# Patient Record
Sex: MALE | Race: WHITE | Employment: FULL TIME | URBAN - METROPOLITAN AREA
[De-identification: names, ages, dates, MRNs, and addresses within clinical notes are randomized per-mention and may not be internally consistent; named-entity substitution may affect disease eponyms.]

---

## 2017-05-10 ENCOUNTER — TRANSFERRED RECORDS (OUTPATIENT)
Dept: HEALTH INFORMATION MANAGEMENT | Facility: CLINIC | Age: 57
End: 2017-05-10

## 2017-05-26 ENCOUNTER — TRANSFERRED RECORDS (OUTPATIENT)
Dept: HEALTH INFORMATION MANAGEMENT | Facility: CLINIC | Age: 57
End: 2017-05-26

## 2017-06-09 ENCOUNTER — MEDICAL CORRESPONDENCE (OUTPATIENT)
Dept: HEALTH INFORMATION MANAGEMENT | Facility: CLINIC | Age: 57
End: 2017-06-09

## 2017-06-16 DIAGNOSIS — H53.10 SUBJECTIVE VISUAL DISTURBANCE: Primary | ICD-10-CM

## 2017-06-19 ENCOUNTER — TELEPHONE (OUTPATIENT)
Dept: OPHTHALMOLOGY | Facility: CLINIC | Age: 57
End: 2017-06-19

## 2017-06-19 NOTE — TELEPHONE ENCOUNTER
----- Message from Ritu Rodríguez sent at 6/16/2017  4:50 PM CDT -----      ----- Message -----     From: Tee Yost MD     Sent: 6/16/2017   6:52 AM       To: ASTRID Gutierrez    Can you please make sure patient brings films or they get pushed? Thanks.

## 2017-06-22 ENCOUNTER — OFFICE VISIT (OUTPATIENT)
Dept: OPHTHALMOLOGY | Facility: CLINIC | Age: 57
End: 2017-06-22
Attending: OPHTHALMOLOGY
Payer: COMMERCIAL

## 2017-06-22 DIAGNOSIS — H50.15 ALTERNATING EXOTROPIA: ICD-10-CM

## 2017-06-22 DIAGNOSIS — H53.10 SUBJECTIVE VISUAL DISTURBANCE: ICD-10-CM

## 2017-06-22 DIAGNOSIS — G70.00 MYASTHENIA (H): Primary | ICD-10-CM

## 2017-06-22 PROCEDURE — 92060 SENSORIMOTOR EXAMINATION: CPT | Mod: ZF | Performed by: OPHTHALMOLOGY

## 2017-06-22 PROCEDURE — 99215 OFFICE O/P EST HI 40 MIN: CPT | Mod: ZF

## 2017-06-22 PROCEDURE — 36415 COLL VENOUS BLD VENIPUNCTURE: CPT | Performed by: OPHTHALMOLOGY

## 2017-06-22 PROCEDURE — 83519 RIA NONANTIBODY: CPT | Performed by: OPHTHALMOLOGY

## 2017-06-22 RX ORDER — ATORVASTATIN CALCIUM 10 MG/1
10 TABLET, FILM COATED ORAL DAILY
COMMUNITY
Start: 2017-05-19

## 2017-06-22 RX ORDER — ASPIRIN 81 MG/1
81 TABLET ORAL EVERY EVENING
COMMUNITY
Start: 2012-02-24

## 2017-06-22 RX ORDER — METRONIDAZOLE 10 MG/G
GEL TOPICAL
COMMUNITY
Start: 2017-04-07 | End: 2018-09-26

## 2017-06-22 RX ORDER — PYRIDOSTIGMINE BROMIDE 60 MG/1
60 TABLET ORAL 3 TIMES DAILY
Qty: 90 TABLET | Refills: 3 | Status: SHIPPED | OUTPATIENT
Start: 2017-06-22 | End: 2017-06-28

## 2017-06-22 RX ORDER — LOSARTAN POTASSIUM AND HYDROCHLOROTHIAZIDE 12.5; 5 MG/1; MG/1
1 TABLET ORAL 2 TIMES DAILY
COMMUNITY
Start: 2017-05-19

## 2017-06-22 ASSESSMENT — REFRACTION_WEARINGRX
SPECS_TYPE: PAL
OD_SPHERE: -3.25
OD_ADD: +2.50
OS_ADD: +2.50
OS_CYLINDER: +1.25
OS_SPHERE: -3.50
OS_AXIS: 175
OD_AXIS: 180
OD_CYLINDER: +1.50

## 2017-06-22 ASSESSMENT — CONF VISUAL FIELD
OD_SUPERIOR_TEMPORAL_RESTRICTION: 3
OS_SUPERIOR_TEMPORAL_RESTRICTION: 3
COMMENTS: UPPER LIDS

## 2017-06-22 ASSESSMENT — SLIT LAMP EXAM - LIDS: COMMENTS: MILD PTOSIS

## 2017-06-22 ASSESSMENT — TONOMETRY
IOP_METHOD: TONOPEN
OD_IOP_MMHG: 18
OS_IOP_MMHG: 19

## 2017-06-22 ASSESSMENT — VISUAL ACUITY
OD_CC: 20/30
CORRECTION_TYPE: GLASSES
OS_CC+: -1
METHOD: SNELLEN - LINEAR
OS_CC: 20/25
OD_PH_CC: 20/25+2

## 2017-06-22 ASSESSMENT — MARGIN REFLEX DISTANCE
OS_MRD1: -3
OD_MRD1: 1

## 2017-06-22 ASSESSMENT — CUP TO DISC RATIO
OD_RATIO: 0.3
OS_RATIO: 0.3

## 2017-06-22 ASSESSMENT — EXTERNAL EXAM - RIGHT EYE: OD_EXAM: NORMAL

## 2017-06-22 ASSESSMENT — EXTERNAL EXAM - LEFT EYE: OS_EXAM: NORMAL

## 2017-06-22 NOTE — MR AVS SNAPSHOT
After Visit Summary   6/22/2017    Fidel Dailey    MRN: [id number]           Patient Information     Date Of Birth          1960        Visit Information        Provider Department      6/22/2017 7:30 AM Tee Yost MD [facility]        Today's Diagnoses     Myasthenia (H)    -  1    Subjective visual disturbance           Follow-ups after your visit        Your next 10 appointments already scheduled     Jun 22, 2017  3:40 PM CDT   (Arrive by 3:25 PM)   CT CHEST W CONTRAST with UCCT2   [facility] [place] [facility] ([facility])    [address] [address] [phone]           Please bring any scans or X-rays taken at other hospitals, if similar tests were done. Also bring a list of your medicines, including vitamins, minerals and over-the-counter drugs. It is safest to leave personal items at home.  Be sure to tell your doctor:   If you have any allergies.   If there s any chance you are pregnant.   If you are breastfeeding.   If you have any special needs.  You will have contrast for this exam. To prepare:   Do not eat or drink for 2 hours before your exam. If you need to take medicine, you may take it with small sips of water. (We may ask you to take liquid medicine as well.)   The day before your exam, drink extra fluids at least six 8-ounce glasses (unless your doctor tells you to restrict your fluids).  Patients over 70 or patients with diabetes or kidney problems:   If you haven t had a blood test (creatinine test) within the last 30 days, go to your clinic or Diagnostic Imaging Department for this test.  If you have diabetes:   If your kidney function is normal, continue taking your metformin (Avandamet, Glucophage, Glucovance, Metaglip) on the day of your exam.   If your kidney function is abnormal, wait 48 hours before restarting this medicine.  Please wear loose clothing, such as a sweat suit or jogging clothes.  Avoid snaps, zippers and other metal. We may ask you to undress and put on a hospital gown.  If you have any questions, please call the Imaging Department where you will have your exam.            2017  8:00 AM CDT   RETURN NEURO with Tee Yost MD   Eye Clinic ([facility])    [address]  [address]  [address]  [facility] [address]   [phone]              Future tests that were ordered for you today     Open Future Orders        Priority Expected Expires Ordered    CT Chest w contrast* Routine  2018            Who to contact     Please call your clinic at [phone] to:    Ask questions about your health    Make or cancel appointments    Discuss your medicines    Learn about your test results    Speak to your doctor   If you have compliments or concerns about an experience at your clinic, or if you wish to file a complaint, please contact [facility] Patient Relations at [phone] or email us at [email or web address]         Additional Information About Your Visit        Shopliment Information     Shopliment is an electronic gateway that provides easy, online access to your medical records. With Shopliment, you can request a clinic appointment, read your test results, renew a prescription or communicate with your care team.     To sign up for Shopliment visit the website at [email or web address]   You will be asked to enter the access code listed below, as well as some personal information. Please follow the directions to create your username and password.     Your access code is: [email or web address]  Expires: 9/10/2017  6:31 AM     Your access code will  in 90 days. If you need help or a new code, please contact your [facility] or call [phone] for assistance.        Care EveryWhere ID     This is your Care EveryWhere ID. This could be used by other organizations to access  your [place] medical records  [id number]         Blood Pressure from Last 3 Encounters:   No data found for BP    Weight from Last 3 Encounters:   No data found for Wt              We Performed the Following     Acetylcholine receptor binding          Today's Medication Changes          These changes are accurate as of: 6/22/17  9:24 AM.  If you have any questions, ask your nurse or doctor.               Start taking these medicines.        Dose/Directions    pyridostigmine 60 MG tablet   Commonly known as:  MESTINON   Used for:  Subjective visual disturbance, Myasthenia (H)   Started by:  Tee Yost MD        Dose:  60 mg   Take 1 tablet (60 mg) by mouth 3 times daily   Quantity:  90 tablet   Refills:  3            Where to get your medicines      These medications were sent to [facility] - [address] [address]     Phone:  [phone]     pyridostigmine 60 MG tablet                Primary Care Provider Office Phone # Fax #    Tyree Alcantara [phone]       [facility] [place] [address]   [place] [address]        Equal Access to Services     [facility]: Hadii aad ku hadasho Soomaali, waaxda luqadaha, qaybta kaalmada adeegyada, waxay natalia haychristopher gamez . So [facility] [phone].    ATENCIÓN: Si habla español, tiene a mehta disposición servicios gratuitos de asistencia lingüística. [facility] [phone].    We comply with applicable federal civil rights laws and [place] laws. We do not discriminate on the basis of race, color, national origin, age, disability sex, sexual orientation or gender identity.            Thank you!     Thank you for choosing EYE CLINIC  for your care. Our goal is always to provide you with excellent care. Hearing back from our patients is one way we can continue to improve our services. Please take a few minutes to complete the written survey that you may  receive in the mail after your visit with us. Thank you!             Your Updated Medication List - Protect others around you: Learn how to safely use, store and throw away your medicines at www.disposemymeds.org.          This list is accurate as of: 6/22/17  9:24 AM.  Always use your most recent med list.                   Brand Name Dispense Instructions for use Diagnosis    aspirin EC 81 MG EC tablet      Take 81 mg by mouth        atorvastatin 10 MG tablet    LIPITOR     Take 10 mg by mouth        BYETTA 10 MCG PEN 10 MCG/0.04ML injection   Generic drug:  exenatide           losartan-hydrochlorothiazide 50-12.5 MG per tablet    HYZAAR     Take 1 tablet by mouth        metFORMIN 1000 MG tablet    GLUCOPHAGE          metroNIDAZOLE 1 % gel    METROGEL          pyridostigmine 60 MG tablet    MESTINON    90 tablet    Take 1 tablet (60 mg) by mouth 3 times daily    Subjective visual disturbance, Myasthenia (H)

## 2017-06-22 NOTE — PROGRESS NOTES
Assessment & Plan     Fidel Dailey is a 56 year old male with the following diagnoses:   1. Myasthenia (H)    2. Subjective visual disturbance    3. Alternating exotropia       Fidel Dailey is a 56 year old male presenting with 9 week history of acute onset left ptosis and diplopia. Positive Cogan lid twitch and positive ice pack test and sleep test  in clinic today, concerning for possible diagnosis of myasthenia gravis. This would fit with his previous similar symptoms last October with the right eyelid. His MRI was not strongly convincing for third nerve schwannoma. Will trial Mestinon and check myasthenia labs, as well as chest CT to evaluate for mediastinal tumor.  Follow up 3 weeks sooner as needed for worsening symptoms.               Attending Physician Attestation:  Complete documentation of historical and exam elements from today's encounter can be found in the full encounter summary report (not reduplicated in this progress note).  I personally obtained the chief complaint(s) and history of present illness.  I confirmed and edited as necessary the review of systems, past medical/surgical history, family history, social history, and examination findings as documented by others; and I examined the patient myself.  I personally reviewed the relevant tests, images, and reports as documented above.  I formulated and edited as necessary the assessment and plan and discussed the findings and management plan with the patient and family. - Tee Yost MD    Baseline          After the ice test      After the sleep test

## 2017-06-22 NOTE — NURSING NOTE
"Chief Complaints and History of Present Illnesses   Patient presents with     Consult For     referred by Dr. Alcantara (PCP) for double vision, 3rd nerve palsy, nerve sheath tumor     HPI    Affected eye(s):  Both   Symptoms:     No decreased vision   Double vision (Comment: started off and on at the end of April; goes away if pt closes an eye)   Floaters (Comment: longstanding/stable)   No flashes      Duration:  9 weeks      Do you have eye pain now?:  No      Comments:  Pt referred by PCP for diplopia, 3rd nerve palsy, nerve sheath tumor  MRI done in May - report scanned in Epic  Pt states dizziness has been more prevalent over the past couple weeks - believes related to double vision \"like motion sickness\"    Pt is diabetic but doesn't check BS daily - states his A1c is good    Sheridan Diaz COA 7:17 AM June 22, 2017              "

## 2017-06-23 ENCOUNTER — TELEPHONE (OUTPATIENT)
Dept: OPHTHALMOLOGY | Facility: CLINIC | Age: 57
End: 2017-06-23

## 2017-06-23 NOTE — TELEPHONE ENCOUNTER
Message  Received: Yesterday       Tee Yost MD Crannick, David, COA                     Your CT chest did not show any mass under your breast bone.  You had a few nodules.  Like we discussed, these are common and likely do not represent anything concerning. The radiologist did not even comment on a repeat scan for them.        Thank you for allowing me to share in your care.     Tee Yost MD

## 2017-06-27 DIAGNOSIS — H53.10 SUBJECTIVE VISUAL DISTURBANCE: Primary | ICD-10-CM

## 2017-06-28 DIAGNOSIS — H53.10 SUBJECTIVE VISUAL DISTURBANCE: ICD-10-CM

## 2017-06-28 DIAGNOSIS — G70.00 MYASTHENIA (H): ICD-10-CM

## 2017-06-28 RX ORDER — PYRIDOSTIGMINE BROMIDE 60 MG/1
60 TABLET ORAL 3 TIMES DAILY
Qty: 90 TABLET | Refills: 3 | Status: SHIPPED | OUTPATIENT
Start: 2017-06-28 | End: 2018-09-26

## 2017-06-29 LAB — ACHR BIND AB SER-SCNC: 7.8 NMOL/L

## 2017-06-30 ENCOUNTER — MYC MEDICAL ADVICE (OUTPATIENT)
Dept: OPHTHALMOLOGY | Facility: CLINIC | Age: 57
End: 2017-06-30

## 2017-06-30 NOTE — TELEPHONE ENCOUNTER
Pt started mestinon yesterday 60 mg 3/day  Pt states both legs noticably twitching and and tingling last night and somewhat today  Not severe and tolerable  Pt would like to update dr. Yost    Pt states will continue to use.    Note to dr. Chavez for review and callback to pt if would like to change medication regimen  Dr. Chavez in this afternoon    Pt seemed pleased with plan  Enrique Pascal RN 11:34 AM 06/30/17

## 2017-07-11 ENCOUNTER — OFFICE VISIT (OUTPATIENT)
Dept: OPHTHALMOLOGY | Facility: CLINIC | Age: 57
End: 2017-07-11
Attending: OPHTHALMOLOGY
Payer: COMMERCIAL

## 2017-07-11 DIAGNOSIS — G70.00 MYASTHENIA (H): Primary | ICD-10-CM

## 2017-07-11 DIAGNOSIS — H50.15 ALTERNATING EXOTROPIA: ICD-10-CM

## 2017-07-11 DIAGNOSIS — H53.10 SUBJECTIVE VISUAL DISTURBANCE: ICD-10-CM

## 2017-07-11 PROCEDURE — 92060 SENSORIMOTOR EXAMINATION: CPT | Mod: ZF | Performed by: OPHTHALMOLOGY

## 2017-07-11 PROCEDURE — 99212 OFFICE O/P EST SF 10 MIN: CPT | Mod: ZF

## 2017-07-11 RX ORDER — PREDNISONE 10 MG/1
50 TABLET ORAL DAILY
Qty: 150 TABLET | Refills: 3 | Status: SHIPPED | OUTPATIENT
Start: 2017-07-11 | End: 2017-08-01

## 2017-07-11 ASSESSMENT — REFRACTION_WEARINGRX
OS_AXIS: 175
OD_CYLINDER: +1.50
OD_SPHERE: -3.25
OD_ADD: +2.50
SPECS_TYPE: PAL
OD_AXIS: 180
OS_SPHERE: -3.50
OS_CYLINDER: +1.25
OS_ADD: +2.50

## 2017-07-11 ASSESSMENT — VISUAL ACUITY
OS_CC: 20/25
CORRECTION_TYPE: GLASSES
OD_PH_CC: 20/25
OD_CC: 20/30
METHOD: SNELLEN - LINEAR

## 2017-07-11 ASSESSMENT — EXTERNAL EXAM - LEFT EYE: OS_EXAM: NORMAL

## 2017-07-11 ASSESSMENT — CONF VISUAL FIELD
OS_SUPERIOR_TEMPORAL_RESTRICTION: 3
OD_NORMAL: 1
OS_INFERIOR_NASAL_RESTRICTION: 3

## 2017-07-11 ASSESSMENT — TONOMETRY
OD_IOP_MMHG: 20
OS_IOP_MMHG: 18
IOP_METHOD: ICARE

## 2017-07-11 ASSESSMENT — EXTERNAL EXAM - RIGHT EYE: OD_EXAM: NORMAL

## 2017-07-11 ASSESSMENT — SLIT LAMP EXAM - LIDS
COMMENTS: NORMAL
COMMENTS: NORMAL

## 2017-07-11 NOTE — NURSING NOTE
Chief Complaints and History of Present Illnesses   Patient presents with     Follow Up For     Myasthenia      HPI    Affected eye(s):  Both   Symptoms:        Duration:  1 month   Frequency:  Constant       Do you have eye pain now?:  No      Comments:  Pt. States no change in VA BE.  Has been having headaches daily.  LE still wanting to stay closed at all times.   Jessica Griggs COT 8:16 AM July 11, 2017

## 2017-07-11 NOTE — MR AVS SNAPSHOT
After Visit Summary   7/11/2017    Fidel Dailey    MRN: [id number]           Patient Information     Date Of Birth          1960        Visit Information        Provider Department      7/11/2017 8:00 AM Tee Yost MD [facility]        Today's Diagnoses     Myasthenia (H)    -  1    Subjective visual disturbance           Follow-ups after your visit        Follow-up notes from your care team     Return in about 3 weeks (around 8/1/2017).      Your next 10 appointments already scheduled     Aug 01, 2017  9:00 AM CDT   RETURN NEURO with Tee Yost MD   Eye Clinic ([facility])    [address]  [address]  9th [address] Clin [address]a  [facility] [address] [phone]              Who to contact     Please call your clinic at [phone] to:    Ask questions about your health    Make or cancel appointments    Discuss your medicines    Learn about your test results    Speak to your doctor   If you have compliments or concerns about an experience at your clinic, or if you wish to file a complaint, please contact [facility] Patient Relations at [phone] or email us at [email or web address]         Additional Information About Your Visit        MyChart Information     Vivoxidt gives you secure access to your electronic health record. If you see a primary care provider, you can also send messages to your care team and make appointments. If you have questions, please call your primary care clinic.  If you do not have a primary care provider, please call [phone] and they will assist you.      IceBreaker is an electronic gateway that provides easy, online access to your medical records. With IceBreaker, you can request a clinic appointment, read your test results, renew a prescription or communicate with your care team.     To access your existing account, please contact your [facility] or  call [phone] for assistance.        Care EveryWhere ID     This is your Care EveryWhere ID. This could be used by other organizations to access your [place] medical records  [id number]         Blood Pressure from Last 3 Encounters:   No data found for BP    Weight from Last 3 Encounters:   No data found for Wt              We Performed the Following     Color Vision - Screening OU (both eyes)     IOP Measurement          Today's Medication Changes          These changes are accurate as of: 7/11/17  8:49 AM.  If you have any questions, ask your nurse or doctor.               Start taking these medicines.        Dose/Directions    predniSONE 10 MG tablet   Commonly known as:  DELTASONE   Used for:  Subjective visual disturbance, Myasthenia (H)   Started by:  Tee Yost MD        Dose:  50 mg   Take 5 tablets (50 mg) by mouth daily   Quantity:  150 tablet   Refills:  3            Where to get your medicines      These medications were sent to [facility]pharm[facility] #[id number] - [place], [place] - [address] [facility] [address]     Phone:  [phone]     predniSONE 10 MG tablet                Primary Care Provider Office Phone # Fax #    Tyree Alcantara [phone]       [facility] [place] [address] [facility] [address]        Equal Access to Services     THOAMS ZIEGLER AH: Hadii suzan ku hadasho Soomaali, waaxda luqadaha, qaybta kaalmada adeegyada, jone dee. So [facility] [phone].    ATENCIÓN: Si habla español, tiene a mehta disposición servicios gratuitos de asistencia lingüística. Llame al [phone].    We comply with applicable federal civil rights laws and [place] laws. We do not discriminate on the basis of race, color, national origin, age, disability sex, sexual orientation or gender identity.            Thank you!     Thank you for choosing EYE CLINIC  for your care. Our goal is always to provide you with excellent care. Hearing  back from our patients is one way we can continue to improve our services. Please take a few minutes to complete the written survey that you may receive in the mail after your visit with us. Thank you!             Your Updated Medication List - Protect others around you: Learn how to safely use, store and throw away your medicines at www.disposemymeds.org.          This list is accurate as of: 7/11/17  8:49 AM.  Always use your most recent med list.                   Brand Name Dispense Instructions for use Diagnosis    aspirin EC 81 MG EC tablet      Take 81 mg by mouth        atorvastatin 10 MG tablet    LIPITOR     Take 10 mg by mouth        BYETTA 10 MCG PEN 10 MCG/0.04ML injection   Generic drug:  exenatide           losartan-hydrochlorothiazide 50-12.5 MG per tablet    HYZAAR     Take 1 tablet by mouth        metFORMIN 1000 MG tablet    GLUCOPHAGE          metroNIDAZOLE 1 % gel    METROGEL          predniSONE 10 MG tablet    DELTASONE    150 tablet    Take 5 tablets (50 mg) by mouth daily    Subjective visual disturbance, Myasthenia (H)       pyridostigmine 60 MG tablet    MESTINON    90 tablet    Take 1 tablet (60 mg) by mouth 3 times daily    Subjective visual disturbance, Myasthenia (H)

## 2017-07-11 NOTE — PROGRESS NOTES
Assessment & Plan     Fidel Dailey is a 56 year old male with the following diagnoses:   1. Myasthenia (H)    2. Subjective visual disturbance      Try stopping mestinon  Try a trial of prednisone 50 mg/d   Follow up with primary care doctor within 4 days of starting 50 mg  Follow up with me in 2-3 weeks sooner as needed for worsening symptoms   Consider repeat MRI for potential schwannoma if no benefit to prednisone             Attending Physician Attestation:  Complete documentation of historical and exam elements from today's encounter can be found in the full encounter summary report (not reduplicated in this progress note).  I personally obtained the chief complaint(s) and history of present illness.  I confirmed and edited as necessary the review of systems, past medical/surgical history, family history, social history, and examination findings as documented by others; and I examined the patient myself.  I personally reviewed the relevant tests, images, and reports as documented above.  I formulated and edited as necessary the assessment and plan and discussed the findings and management plan with the patient and family. - Tee Yost MD

## 2017-08-01 ENCOUNTER — OFFICE VISIT (OUTPATIENT)
Dept: OPHTHALMOLOGY | Facility: CLINIC | Age: 57
End: 2017-08-01
Attending: OPHTHALMOLOGY
Payer: COMMERCIAL

## 2017-08-01 DIAGNOSIS — H53.10 SUBJECTIVE VISUAL DISTURBANCE: ICD-10-CM

## 2017-08-01 DIAGNOSIS — G70.00 MYASTHENIA (H): Primary | ICD-10-CM

## 2017-08-01 PROCEDURE — 99212 OFFICE O/P EST SF 10 MIN: CPT | Mod: ZF

## 2017-08-01 RX ORDER — PREDNISONE 10 MG/1
50 TABLET ORAL DAILY
Qty: 150 TABLET | Refills: 3 | Status: SHIPPED | OUTPATIENT
Start: 2017-08-01 | End: 2018-09-26

## 2017-08-01 ASSESSMENT — TONOMETRY
OD_IOP_MMHG: 23
OS_IOP_MMHG: 21
IOP_METHOD: ICARE

## 2017-08-01 ASSESSMENT — VISUAL ACUITY
OD_CC+: -2
OD_CC: 20/30
OS_PH_CC: 20/20-2
OS_CC: 20/25
METHOD: SNELLEN - LINEAR
OS_CC+: -2
CORRECTION_TYPE: GLASSES
OD_PH_CC: 20/20

## 2017-08-01 ASSESSMENT — CONF VISUAL FIELD
OS_NORMAL: 1
OD_NORMAL: 1

## 2017-08-01 ASSESSMENT — SLIT LAMP EXAM - LIDS
COMMENTS: NORMAL
COMMENTS: NORMAL

## 2017-08-01 ASSESSMENT — EXTERNAL EXAM - LEFT EYE: OS_EXAM: PTOSIS

## 2017-08-01 ASSESSMENT — EXTERNAL EXAM - RIGHT EYE: OD_EXAM: PTOSIS

## 2017-08-01 NOTE — PROGRESS NOTES
Assessment & Plan     Fidel Dailey is a 56 year old male with the following diagnoses:   1. 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  No systemic symptoms.  He is improved on mestinon.  He has been on prednisone 50 mg x 1 week without significant benefit but some.  Will keep on prednisone 40 mg/d x 2 weeks.  Will give him a 10 base in Fresnel prism LEFT eye today.  Follow up 6 weeks sooner as needed for worsening symptoms.              Attending Physician Attestation:  Complete documentation of historical and exam elements from today's encounter can be found in the full encounter summary report (not reduplicated in this progress note).  I personally obtained the chief complaint(s) and history of present illness.  I confirmed and edited as necessary the review of systems, past medical/surgical history, family history, social history, and examination findings as documented by others; and I examined the patient myself.  I personally reviewed the relevant tests, images, and reports as documented above.  I formulated and edited as necessary the assessment and plan and discussed the findings and management plan with the patient and family. - Tee Yost MD

## 2017-08-01 NOTE — NURSING NOTE
Chief Complaints and History of Present Illnesses   Patient presents with     Follow Up For     Myasthenia     HPI    Affected eye(s):  Both   Symptoms:        Duration:  2 weeks   Frequency:  Constant       Do you have eye pain now?:  No      Comments:  Pt. States that he is doing well.  VA is still blurred but has improved.   Drooping has also improved.   Jessica Griggs COT 9:22 AM August 1, 2017

## 2017-08-01 NOTE — MR AVS SNAPSHOT
After Visit Summary   8/1/2017    Fidel Dailey    MRN: [id number]           Patient Information     Date Of Birth          1960        Visit Information        Provider Department      8/1/2017 9:00 AM Tee Yost MD Eye Clinic        Today's Diagnoses     Myasthenia (H)    -  1    Subjective visual disturbance           Follow-ups after your visit        Follow-up notes from your care team     Return in about 6 weeks (around 9/12/2017) for Vision, tension color.      Your next 10 appointments already scheduled     Sep 14, 2017 10:00 AM CDT   RETURN NEURO with Tee Yost MD   Eye Clinic ([facility])    [address]  [address]  9th [address] Clin [address]  [facility] [address] [phone]              Who to contact     Please call your clinic at [phone] to:    Ask questions about your health    Make or cancel appointments    Discuss your medicines    Learn about your test results    Speak to your doctor   If you have compliments or concerns about an experience at your clinic, or if you wish to file a complaint, please contact [facility] Patient Relations at [phone] or email us at [email or web address]         Additional Information About Your Visit        MyChart Information     Jobvitet gives you secure access to your electronic health record. If you see a primary care provider, you can also send messages to your care team and make appointments. If you have questions, please call your primary care clinic.  If you do not have a primary care provider, please call [phone] and they will assist you.      NetHooks is an electronic gateway that provides easy, online access to your medical records. With NetHooks, you can request a clinic appointment, read your test results, renew a prescription or communicate with your care team.     To access your existing account, please contact your [facility]  [place] [facility] or call [phone] for assistance.        Care EveryWhere ID     This is your Care EveryWhere ID. This could be used by other organizations to access your [place] medical records  [id number]         Blood Pressure from Last 3 Encounters:   No data found for BP    Weight from Last 3 Encounters:   No data found for Wt              Today, you had the following     No orders found for display         Where to get your medicines      These medications were sent to [facility] - [place] [address] [facility]  [address] [facility] [address]     Phone:  [phone]     predniSONE 10 MG tablet          Primary Care Provider Office Phone # Fax #    Tyree Alcantara [phone]       [facility] [address] [facility] [address]        Equal Access to Services     THOMAS ZIEGLER : Hadii suzan ku hadasho Soomaali, waaxda luqadaha, qaybta kaalmada adeegyada, waxruddy fisher haychristopher gamez . So [facility] [phone].    ATENCIÓN: Si habla español, tiene a mehta disposición servicios gratuitos de asistencia lingüística. LlTri[facility] [phone].    We comply with applicable federal civil rights laws and [place] laws. We do not discriminate on the basis of race, color, national origin, age, disability sex, sexual orientation or gender identity.            Thank you!     Thank you for choosing EYE CLINIC  for your care. Our goal is always to provide you with excellent care. Hearing back from our patients is one way we can continue to improve our services. Please take a few minutes to complete the written survey that you may receive in the mail after your visit with us. Thank you!             Your Updated Medication List - Protect others around you: Learn how to safely use, store and throw away your medicines at www.disposemymeds.org.          This list is accurate as of: 8/1/17 10:06 AM.  Always use your most recent med list.                    Brand Name Dispense Instructions for use Diagnosis    aspirin EC 81 MG EC tablet      Take 81 mg by mouth        atorvastatin 10 MG tablet    LIPITOR     Take 10 mg by mouth        BYETTA 10 MCG PEN 10 MCG/0.04ML injection   Generic drug:  exenatide           losartan-hydrochlorothiazide 50-12.5 MG per tablet    HYZAAR     Take 1 tablet by mouth        metFORMIN 1000 MG tablet    GLUCOPHAGE          metroNIDAZOLE 1 % gel    METROGEL          predniSONE 10 MG tablet    DELTASONE    150 tablet    Take 5 tablets (50 mg) by mouth daily    Subjective visual disturbance, Myasthenia (H)       pyridostigmine 60 MG tablet    MESTINON    90 tablet    Take 1 tablet (60 mg) by mouth 3 times daily    Subjective visual disturbance, Myasthenia (H)

## 2017-09-11 DIAGNOSIS — Z01.00 EXAMINATION OF EYES AND VISION: Primary | ICD-10-CM

## 2017-09-14 ENCOUNTER — OFFICE VISIT (OUTPATIENT)
Dept: OPHTHALMOLOGY | Facility: CLINIC | Age: 57
End: 2017-09-14
Attending: OPHTHALMOLOGY
Payer: COMMERCIAL

## 2017-09-14 DIAGNOSIS — G70.00 MYASTHENIA (H): Primary | ICD-10-CM

## 2017-09-14 PROCEDURE — 99213 OFFICE O/P EST LOW 20 MIN: CPT | Mod: ZF

## 2017-09-14 ASSESSMENT — TONOMETRY
OS_IOP_MMHG: 26
IOP_METHOD: TONOPEN
IOP_METHOD: TONOPEN
OS_IOP_MMHG: 27
OD_IOP_MMHG: 27
OD_IOP_MMHG: 25

## 2017-09-14 ASSESSMENT — REFRACTION_WEARINGRX
OD_ADD: +2.50
OS_CYLINDER: +1.25
OS_AXIS: 175
OD_AXIS: 180
OD_CYLINDER: +1.50
SPECS_TYPE: PAL
OS_SPHERE: -3.50
OS_ADD: +2.50
OD_SPHERE: -3.25

## 2017-09-14 ASSESSMENT — SLIT LAMP EXAM - LIDS
COMMENTS: NORMAL
COMMENTS: NORMAL

## 2017-09-14 ASSESSMENT — VISUAL ACUITY
OD_CC: 20/30
OS_CC: 20/30
METHOD: SNELLEN - LINEAR
OD_PH_CC: 20/20
OS_CC+: +2
CORRECTION_TYPE: GLASSES
OD_CC+: -1
OS_PH_CC: 20/25+1

## 2017-09-14 ASSESSMENT — EXTERNAL EXAM - LEFT EYE: OS_EXAM: PTOSIS

## 2017-09-14 ASSESSMENT — EXTERNAL EXAM - RIGHT EYE: OD_EXAM: PTOSIS

## 2017-09-14 ASSESSMENT — CONF VISUAL FIELD
OD_NORMAL: 1
OS_NORMAL: 1

## 2017-09-14 NOTE — NURSING NOTE
Chief Complaints and History of Present Illnesses   Patient presents with     Follow Up For     myasthenia     HPI    Affected eye(s):  Both   Symptoms:        Duration:  6 weeks   Frequency:  Constant       Do you have eye pain now?:  No      Comments:  6 week f/u myasthenia  Pt took the [employer] prism off the left lens - felt he could see better without it  Pt has noted in the past couple days, the left eye is puffy  Pt notes the vision was better when on the higher doses of prednisone - vision going back to where it was with the taper    Sheridan RESENDIZ 10:03 AM September 14, 2017

## 2017-09-14 NOTE — PROGRESS NOTES
Assessment & Plan     Fidel Dailey is a 56 year old male with the following diagnoses:   1. Myasthenia (H)         He is here for follow up for myasthenia gravis. He is currently on Mestinon 60 mg TID and on Predisone 15 mg BID. He feels that he has been on the higher dose of Prednisone ( 50 mg per day). He has been fitted with a prism last time( 10 BI) over the left eye but he has took it because he does not think it is helping. Diplopia is only occasional but the droopiness of the left upper lid is most of the time. He is diabetic but his blood sugar has been around 100 mgdl/.    Double vision and exotropia seems better but he continues to have significant ptosis. Will try naphazoline eye drops for the ptosis. If no benefit, then will refer to neuromuscular for steroid sparing therapy and/or IVIG.           Attending Physician Attestation:  Complete documentation of historical and exam elements from today's encounter can be found in the full encounter summary report (not reduplicated in this progress note).  I personally obtained the chief complaint(s) and history of present illness.  I confirmed and edited as necessary the review of systems, past medical/surgical history, family history, social history, and examination findings as documented by others; and I examined the patient myself.  I personally reviewed the relevant tests, images, and reports as documented above.  I formulated and edited as necessary the assessment and plan and discussed the findings and management plan with the patient and family. - Tee Yost MD

## 2017-09-14 NOTE — MR AVS SNAPSHOT
After Visit Summary   9/14/2017    Fidel Dailey    MRN: [id number]           Patient Information     Date Of Birth          1960        Visit Information        Provider Department      9/14/2017 10:00 AM T[facility]ost [facility]        Today's Diagnoses     Myasthenia (H)    -  1       Follow-ups after your visit        Additional Services     NEUROLOGY ADULT REFERRAL       Your provider has referred you for the following: neuromuscular referral for seropositive myasthenia gravis       Please be aware that coverage of these services is subject to the terms and limitations of your health insurance plan.  Call member services at your health plan with any benefit or coverage questions.      Please bring the following with you to your appointment:    (1) Any X-Rays, CTs or MRIs which have been performed.  Contact the facility where they were done to arrange for  prior to your scheduled appointment.    (2) List of current medications  (3) This referral request   (4) Any documents/labs given to you for this referral                  Who to contact     Please call your clinic at [phone] to:    Ask questions about your health    Make or cancel appointments    Discuss your medicines    Learn about your test results    Speak to your doctor   If you have compliments or concerns about an experience at your clinic, or if you wish to file a complaint, please contact [facility] Patient Relations at [phone] or email us at [email or web address]         Additional Information About Your Visit        MyChart Information     [employer] gives you secure access to your electronic health record. If you see a primary care provider, you can also send messages to your care team and make appointments. If you have questions, please call your primary care clinic.  If you do not have a primary care provider, please call [phone] and they will assist  you.      [employer] is an electronic gateway that provides easy, online access to your medical records. With [employer], you can request a clinic appointment, read your test results, renew a prescription or communicate with your care team.     To access your existing account, please contact your [facility] or call [phone] for assistance.        Care EveryWhere ID     This is your Care EveryWhere ID. This could be used by other organizations to access your [place] medical records  [id number]         Blood Pressure from Last 3 Encounters:   No data found for BP    Weight from Last 3 Encounters:   No data found for Wt              We Performed the Following     NEUROLOGY ADULT REFERRAL        Primary Care Provider Office Phone # Fax #    Tyree Alcantara [phone]       [facility] [place] [address]   [place] [address]        Equal Access to Services     [facility] : Hadii aad ku hadasho Soomaali, waaxda luqadaha, qaybta kaalmada adeegyada, jone adairin haychristopher gamez . So [facility] [phone].    ATENCIÓN: Si habla español, tiene a mehta disposición servicios gratuitos de asistencia lingüística. Jeanne al [phone].    We comply with applicable federal civil rights laws and [place] laws. We do not discriminate on the basis of race, color, national origin, age, disability sex, sexual orientation or gender identity.            Thank you!     Thank you for choosing [facility]  for your care. Our goal is always to provide you with excellent care. Hearing back from our patients is one way we can continue to improve our services. Please take a few minutes to complete the written survey that you may receive in the mail after your visit with us. Thank you!             Your Updated Medication List - Protect others around you: Learn how to safely use, store and throw away your medicines at www.disposemymeds.org.          This list is accurate as of: 9/14/17   1:31 PM.  Always use your most recent med list.                   Brand Name Dispense Instructions for use Diagnosis    aspirin EC 81 MG EC tablet      Take 81 mg by mouth        atorvastatin 10 MG tablet    LIPITOR     Take 10 mg by mouth        BYETTA 10 MCG PEN 10 MCG/0.04ML injection   Generic drug:  exenatide           losartan-hydrochlorothiazide 50-12.5 MG per tablet    HYZAAR     Take 1 tablet by mouth        metFORMIN 1000 MG tablet    GLUCOPHAGE          metroNIDAZOLE 1 % gel    METROGEL          predniSONE 10 MG tablet    DELTASONE    150 tablet    Take 5 tablets (50 mg) by mouth daily    Subjective visual disturbance, Myasthenia (H)       pyridostigmine 60 MG tablet    MESTINON    90 tablet    Take 1 tablet (60 mg) by mouth 3 times daily    Subjective visual disturbance, Myasthenia (H)

## 2017-09-25 DIAGNOSIS — G70.00 MYASTHENIA (H): ICD-10-CM

## 2017-09-25 DIAGNOSIS — H53.10 SUBJECTIVE VISUAL DISTURBANCE: ICD-10-CM

## 2017-09-25 RX ORDER — PYRIDOSTIGMINE BROMIDE 60 MG/1
60 TABLET ORAL 3 TIMES DAILY
Qty: 90 TABLET | Refills: 3 | Status: ON HOLD | OUTPATIENT
Start: 2017-09-25 | End: 2018-09-26

## 2017-09-25 NOTE — TELEPHONE ENCOUNTER
MESTINON 60 MG        Last Written Prescription Date:  6/28/17  Last Fill Quantity: 90,   # refills: 3  Last Office Visit : 9/14/17  Future Office visit:  0    Routing refill request to provider for review/approval because: LV  9/14/17  NO NOTE TO RF/CONT MED

## 2017-10-16 ENCOUNTER — TRANSFERRED RECORDS (OUTPATIENT)
Dept: HEALTH INFORMATION MANAGEMENT | Facility: CLINIC | Age: 57
End: 2017-10-16

## 2017-10-17 ENCOUNTER — TRANSFERRED RECORDS (OUTPATIENT)
Dept: HEALTH INFORMATION MANAGEMENT | Facility: CLINIC | Age: 57
End: 2017-10-17

## 2017-10-20 ENCOUNTER — TRANSFERRED RECORDS (OUTPATIENT)
Dept: HEALTH INFORMATION MANAGEMENT | Facility: CLINIC | Age: 57
End: 2017-10-20

## 2017-11-30 ENCOUNTER — TRANSFERRED RECORDS (OUTPATIENT)
Dept: HEALTH INFORMATION MANAGEMENT | Facility: CLINIC | Age: 57
End: 2017-11-30

## 2018-03-08 ENCOUNTER — MEDICAL CORRESPONDENCE (OUTPATIENT)
Dept: HEALTH INFORMATION MANAGEMENT | Facility: CLINIC | Age: 58
End: 2018-03-08

## 2018-03-08 ENCOUNTER — TRANSFERRED RECORDS (OUTPATIENT)
Dept: HEALTH INFORMATION MANAGEMENT | Facility: CLINIC | Age: 58
End: 2018-03-08

## 2018-04-19 ENCOUNTER — TRANSFERRED RECORDS (OUTPATIENT)
Dept: HEALTH INFORMATION MANAGEMENT | Facility: CLINIC | Age: 58
End: 2018-04-19

## 2018-09-26 ENCOUNTER — HOSPITAL ENCOUNTER (INPATIENT)
Facility: CLINIC | Age: 58
LOS: 2 days | Discharge: HOME | End: 2018-09-28
Attending: EMERGENCY MEDICINE | Admitting: INTERNAL MEDICINE
Payer: COMMERCIAL

## 2018-09-26 ENCOUNTER — APPOINTMENT (OUTPATIENT)
Dept: GENERAL RADIOLOGY | Facility: CLINIC | Age: 58
End: 2018-09-26
Attending: EMERGENCY MEDICINE
Payer: COMMERCIAL

## 2018-09-26 DIAGNOSIS — J20.9 ACUTE BRONCHITIS, UNSPECIFIED ORGANISM: ICD-10-CM

## 2018-09-26 DIAGNOSIS — J96.01 ACUTE RESPIRATORY FAILURE WITH HYPOXIA (H): ICD-10-CM

## 2018-09-26 PROBLEM — J96.90 RESPIRATORY FAILURE (H): Status: ACTIVE | Noted: 2018-09-26

## 2018-09-26 LAB
ANION GAP SERPL CALCULATED.3IONS-SCNC: 8 MMOL/L (ref 3–14)
BASE DEFICIT BLDV-SCNC: 1.1 MMOL/L
BASOPHILS # BLD AUTO: 0.1 10E9/L (ref 0–0.2)
BASOPHILS NFR BLD AUTO: 0.7 %
BUN SERPL-MCNC: 18 MG/DL (ref 7–30)
CALCIUM SERPL-MCNC: 9.3 MG/DL (ref 8.5–10.1)
CHLORIDE SERPL-SCNC: 102 MMOL/L (ref 94–109)
CO2 SERPL-SCNC: 30 MMOL/L (ref 20–32)
CREAT BLD-MCNC: 0.9 MG/DL (ref 0.66–1.25)
CREAT SERPL-MCNC: 0.79 MG/DL (ref 0.66–1.25)
CREAT SERPL-MCNC: 0.89 MG/DL (ref 0.66–1.25)
D DIMER PPP FEU-MCNC: 0.4 UG/ML FEU (ref 0–0.5)
DIFFERENTIAL METHOD BLD: ABNORMAL
EOSINOPHIL # BLD AUTO: 0.3 10E9/L (ref 0–0.7)
EOSINOPHIL NFR BLD AUTO: 3.3 %
ERYTHROCYTE [DISTWIDTH] IN BLOOD BY AUTOMATED COUNT: 15.3 % (ref 10–15)
GFR SERPL CREATININE-BSD FRML MDRD: 87 ML/MIN/1.7M2
GFR SERPL CREATININE-BSD FRML MDRD: 88 ML/MIN/1.7M2
GFR SERPL CREATININE-BSD FRML MDRD: >90 ML/MIN/1.7M2
GLUCOSE BLDC GLUCOMTR-MCNC: 239 MG/DL (ref 70–99)
GLUCOSE BLDC GLUCOMTR-MCNC: 355 MG/DL (ref 70–99)
GLUCOSE SERPL-MCNC: 140 MG/DL (ref 70–99)
HBA1C MFR BLD: 6.3 % (ref 0–5.6)
HCO3 BLDV-SCNC: 26 MMOL/L (ref 21–28)
HCT VFR BLD AUTO: 45.4 % (ref 40–53)
HGB BLD-MCNC: 14.5 G/DL (ref 13.3–17.7)
IMM GRANULOCYTES # BLD: 0 10E9/L (ref 0–0.4)
IMM GRANULOCYTES NFR BLD: 0.4 %
LACTATE BLD-SCNC: 1.8 MMOL/L (ref 0.7–2)
LYMPHOCYTES # BLD AUTO: 1.5 10E9/L (ref 0.8–5.3)
LYMPHOCYTES NFR BLD AUTO: 15.3 %
MCH RBC QN AUTO: 27.3 PG (ref 26.5–33)
MCHC RBC AUTO-ENTMCNC: 31.9 G/DL (ref 31.5–36.5)
MCV RBC AUTO: 86 FL (ref 78–100)
MONOCYTES # BLD AUTO: 1 10E9/L (ref 0–1.3)
MONOCYTES NFR BLD AUTO: 9.8 %
NEUTROPHILS # BLD AUTO: 7.1 10E9/L (ref 1.6–8.3)
NEUTROPHILS NFR BLD AUTO: 70.5 %
NRBC # BLD AUTO: 0 10*3/UL
NRBC BLD AUTO-RTO: 0 /100
NT-PROBNP SERPL-MCNC: 37 PG/ML (ref 0–900)
O2/TOTAL GAS SETTING VFR VENT: 35 %
OXYHGB MFR BLDV: 42 %
PCO2 BLDV: 54 MM HG (ref 40–50)
PH BLDV: 7.3 PH (ref 7.32–7.43)
PLATELET # BLD AUTO: 276 10E9/L (ref 150–450)
PLATELET # BLD AUTO: 292 10E9/L (ref 150–450)
PO2 BLDV: 21 MM HG (ref 25–47)
POTASSIUM SERPL-SCNC: 3.1 MMOL/L (ref 3.4–5.3)
PROCALCITONIN SERPL-MCNC: <0.05 NG/ML
RBC # BLD AUTO: 5.31 10E12/L (ref 4.4–5.9)
SODIUM SERPL-SCNC: 140 MMOL/L (ref 133–144)
TROPONIN I SERPL-MCNC: <0.015 UG/L (ref 0–0.04)
WBC # BLD AUTO: 10.1 10E9/L (ref 4–11)

## 2018-09-26 PROCEDURE — 25000132 ZZH RX MED GY IP 250 OP 250 PS 637: Performed by: EMERGENCY MEDICINE

## 2018-09-26 PROCEDURE — 25000128 H RX IP 250 OP 636: Performed by: EMERGENCY MEDICINE

## 2018-09-26 PROCEDURE — 20000003 ZZH R&B ICU

## 2018-09-26 PROCEDURE — 96366 THER/PROPH/DIAG IV INF ADDON: CPT

## 2018-09-26 PROCEDURE — 87641 MR-STAPH DNA AMP PROBE: CPT | Performed by: INTERNAL MEDICINE

## 2018-09-26 PROCEDURE — 85379 FIBRIN DEGRADATION QUANT: CPT | Performed by: EMERGENCY MEDICINE

## 2018-09-26 PROCEDURE — 40000281 ZZH STATISTIC TRANSPORT TIME EA 15 MIN

## 2018-09-26 PROCEDURE — 82565 ASSAY OF CREATININE: CPT

## 2018-09-26 PROCEDURE — 85049 AUTOMATED PLATELET COUNT: CPT | Performed by: INTERNAL MEDICINE

## 2018-09-26 PROCEDURE — 82565 ASSAY OF CREATININE: CPT | Performed by: INTERNAL MEDICINE

## 2018-09-26 PROCEDURE — 25000128 H RX IP 250 OP 636: Performed by: INTERNAL MEDICINE

## 2018-09-26 PROCEDURE — 93005 ELECTROCARDIOGRAM TRACING: CPT

## 2018-09-26 PROCEDURE — 96375 TX/PRO/DX INJ NEW DRUG ADDON: CPT

## 2018-09-26 PROCEDURE — 84132 ASSAY OF SERUM POTASSIUM: CPT | Performed by: INTERNAL MEDICINE

## 2018-09-26 PROCEDURE — 25000132 ZZH RX MED GY IP 250 OP 250 PS 637: Performed by: INTERNAL MEDICINE

## 2018-09-26 PROCEDURE — 25000125 ZZHC RX 250

## 2018-09-26 PROCEDURE — 00000146 ZZHCL STATISTIC GLUCOSE BY METER IP

## 2018-09-26 PROCEDURE — 71045 X-RAY EXAM CHEST 1 VIEW: CPT

## 2018-09-26 PROCEDURE — 94660 CPAP INITIATION&MGMT: CPT

## 2018-09-26 PROCEDURE — 80048 BASIC METABOLIC PNL TOTAL CA: CPT | Performed by: EMERGENCY MEDICINE

## 2018-09-26 PROCEDURE — 87040 BLOOD CULTURE FOR BACTERIA: CPT | Performed by: EMERGENCY MEDICINE

## 2018-09-26 PROCEDURE — 36415 COLL VENOUS BLD VENIPUNCTURE: CPT | Performed by: EMERGENCY MEDICINE

## 2018-09-26 PROCEDURE — 87640 STAPH A DNA AMP PROBE: CPT | Performed by: INTERNAL MEDICINE

## 2018-09-26 PROCEDURE — 99207 ZZC CDG-CODE CATEGORY CHANGED: CPT | Performed by: INTERNAL MEDICINE

## 2018-09-26 PROCEDURE — 83605 ASSAY OF LACTIC ACID: CPT | Performed by: EMERGENCY MEDICINE

## 2018-09-26 PROCEDURE — 82805 BLOOD GASES W/O2 SATURATION: CPT | Performed by: EMERGENCY MEDICINE

## 2018-09-26 PROCEDURE — 36415 COLL VENOUS BLD VENIPUNCTURE: CPT | Performed by: INTERNAL MEDICINE

## 2018-09-26 PROCEDURE — 84145 PROCALCITONIN (PCT): CPT | Performed by: INTERNAL MEDICINE

## 2018-09-26 PROCEDURE — 99223 1ST HOSP IP/OBS HIGH 75: CPT | Mod: AI | Performed by: INTERNAL MEDICINE

## 2018-09-26 PROCEDURE — 94150 VITAL CAPACITY TEST: CPT

## 2018-09-26 PROCEDURE — 85025 COMPLETE CBC W/AUTO DIFF WBC: CPT | Performed by: EMERGENCY MEDICINE

## 2018-09-26 PROCEDURE — 96365 THER/PROPH/DIAG IV INF INIT: CPT

## 2018-09-26 PROCEDURE — 83036 HEMOGLOBIN GLYCOSYLATED A1C: CPT | Performed by: INTERNAL MEDICINE

## 2018-09-26 PROCEDURE — 94640 AIRWAY INHALATION TREATMENT: CPT

## 2018-09-26 PROCEDURE — 84484 ASSAY OF TROPONIN QUANT: CPT | Performed by: EMERGENCY MEDICINE

## 2018-09-26 PROCEDURE — 40000275 ZZH STATISTIC RCP TIME EA 10 MIN

## 2018-09-26 PROCEDURE — 83880 ASSAY OF NATRIURETIC PEPTIDE: CPT | Performed by: EMERGENCY MEDICINE

## 2018-09-26 PROCEDURE — 83735 ASSAY OF MAGNESIUM: CPT | Performed by: INTERNAL MEDICINE

## 2018-09-26 PROCEDURE — 99285 EMERGENCY DEPT VISIT HI MDM: CPT | Mod: 25

## 2018-09-26 RX ORDER — AMOXICILLIN 250 MG
2 CAPSULE ORAL 2 TIMES DAILY PRN
Status: DISCONTINUED | OUTPATIENT
Start: 2018-09-26 | End: 2018-09-28 | Stop reason: HOSPADM

## 2018-09-26 RX ORDER — ONDANSETRON 2 MG/ML
4 INJECTION INTRAMUSCULAR; INTRAVENOUS EVERY 6 HOURS PRN
Status: DISCONTINUED | OUTPATIENT
Start: 2018-09-26 | End: 2018-09-28 | Stop reason: HOSPADM

## 2018-09-26 RX ORDER — AZATHIOPRINE 50 MG/1
50 TABLET ORAL 3 TIMES DAILY
COMMUNITY

## 2018-09-26 RX ORDER — PYRIDOSTIGMINE BROMIDE 60 MG/1
60 TABLET ORAL 3 TIMES DAILY
COMMUNITY

## 2018-09-26 RX ORDER — DEXTROSE MONOHYDRATE 25 G/50ML
25-50 INJECTION, SOLUTION INTRAVENOUS
Status: DISCONTINUED | OUTPATIENT
Start: 2018-09-26 | End: 2018-09-28 | Stop reason: HOSPADM

## 2018-09-26 RX ORDER — METHYLPREDNISOLONE SODIUM SUCCINATE 125 MG/2ML
125 INJECTION, POWDER, LYOPHILIZED, FOR SOLUTION INTRAMUSCULAR; INTRAVENOUS ONCE
Status: DISCONTINUED | OUTPATIENT
Start: 2018-09-26 | End: 2018-09-26

## 2018-09-26 RX ORDER — IPRATROPIUM BROMIDE AND ALBUTEROL SULFATE 2.5; .5 MG/3ML; MG/3ML
6 SOLUTION RESPIRATORY (INHALATION) ONCE
Status: COMPLETED | OUTPATIENT
Start: 2018-09-26 | End: 2018-09-26

## 2018-09-26 RX ORDER — POTASSIUM CHLORIDE 1.5 G/1.58G
20-40 POWDER, FOR SOLUTION ORAL
Status: DISCONTINUED | OUTPATIENT
Start: 2018-09-26 | End: 2018-09-28 | Stop reason: HOSPADM

## 2018-09-26 RX ORDER — MAGNESIUM SULFATE HEPTAHYDRATE 40 MG/ML
4 INJECTION, SOLUTION INTRAVENOUS EVERY 4 HOURS PRN
Status: DISCONTINUED | OUTPATIENT
Start: 2018-09-26 | End: 2018-09-28 | Stop reason: HOSPADM

## 2018-09-26 RX ORDER — NALOXONE HYDROCHLORIDE 0.4 MG/ML
.1-.4 INJECTION, SOLUTION INTRAMUSCULAR; INTRAVENOUS; SUBCUTANEOUS
Status: DISCONTINUED | OUTPATIENT
Start: 2018-09-26 | End: 2018-09-28 | Stop reason: HOSPADM

## 2018-09-26 RX ORDER — PYRIDOSTIGMINE BROMIDE 60 MG/1
60 TABLET ORAL 3 TIMES DAILY
Status: DISCONTINUED | OUTPATIENT
Start: 2018-09-26 | End: 2018-09-26

## 2018-09-26 RX ORDER — POTASSIUM CHLORIDE 7.45 MG/ML
10 INJECTION INTRAVENOUS
Status: DISCONTINUED | OUTPATIENT
Start: 2018-09-26 | End: 2018-09-28 | Stop reason: HOSPADM

## 2018-09-26 RX ORDER — MOMETASONE FUROATE MONOHYDRATE 50 UG/1
2 SPRAY, METERED NASAL DAILY PRN
COMMUNITY

## 2018-09-26 RX ORDER — POTASSIUM CL/LIDO/0.9 % NACL 10MEQ/0.1L
10 INTRAVENOUS SOLUTION, PIGGYBACK (ML) INTRAVENOUS
Status: DISCONTINUED | OUTPATIENT
Start: 2018-09-26 | End: 2018-09-28 | Stop reason: HOSPADM

## 2018-09-26 RX ORDER — POTASSIUM CHLORIDE 29.8 MG/ML
20 INJECTION INTRAVENOUS
Status: DISCONTINUED | OUTPATIENT
Start: 2018-09-26 | End: 2018-09-28 | Stop reason: HOSPADM

## 2018-09-26 RX ORDER — METHYLPREDNISOLONE SODIUM SUCCINATE 40 MG/ML
40 INJECTION, POWDER, LYOPHILIZED, FOR SOLUTION INTRAMUSCULAR; INTRAVENOUS EVERY 8 HOURS
Status: DISCONTINUED | OUTPATIENT
Start: 2018-09-26 | End: 2018-09-27

## 2018-09-26 RX ORDER — NICOTINE POLACRILEX 4 MG
15-30 LOZENGE BUCCAL
Status: DISCONTINUED | OUTPATIENT
Start: 2018-09-26 | End: 2018-09-28 | Stop reason: HOSPADM

## 2018-09-26 RX ORDER — IPRATROPIUM BROMIDE AND ALBUTEROL SULFATE 2.5; .5 MG/3ML; MG/3ML
3 SOLUTION RESPIRATORY (INHALATION) EVERY 4 HOURS PRN
Status: DISCONTINUED | OUTPATIENT
Start: 2018-09-26 | End: 2018-09-28 | Stop reason: HOSPADM

## 2018-09-26 RX ORDER — POTASSIUM CHLORIDE 1500 MG/1
20-40 TABLET, EXTENDED RELEASE ORAL
Status: DISCONTINUED | OUTPATIENT
Start: 2018-09-26 | End: 2018-09-28 | Stop reason: HOSPADM

## 2018-09-26 RX ORDER — PYRIDOSTIGMINE BROMIDE 60 MG/1
60 TABLET ORAL 3 TIMES DAILY
Status: DISCONTINUED | OUTPATIENT
Start: 2018-09-26 | End: 2018-09-28 | Stop reason: HOSPADM

## 2018-09-26 RX ORDER — ONDANSETRON 4 MG/1
4 TABLET, ORALLY DISINTEGRATING ORAL EVERY 6 HOURS PRN
Status: DISCONTINUED | OUTPATIENT
Start: 2018-09-26 | End: 2018-09-28 | Stop reason: HOSPADM

## 2018-09-26 RX ORDER — METRONIDAZOLE 10 MG/G
GEL TOPICAL PRN
COMMUNITY

## 2018-09-26 RX ORDER — POTASSIUM CHLORIDE 1.5 G/1.58G
40 POWDER, FOR SOLUTION ORAL ONCE
Status: COMPLETED | OUTPATIENT
Start: 2018-09-26 | End: 2018-09-26

## 2018-09-26 RX ORDER — IPRATROPIUM BROMIDE AND ALBUTEROL SULFATE 2.5; .5 MG/3ML; MG/3ML
SOLUTION RESPIRATORY (INHALATION)
Status: COMPLETED
Start: 2018-09-26 | End: 2018-09-26

## 2018-09-26 RX ORDER — METHYLPREDNISOLONE SODIUM SUCCINATE 125 MG/2ML
125 INJECTION, POWDER, LYOPHILIZED, FOR SOLUTION INTRAMUSCULAR; INTRAVENOUS ONCE
Status: COMPLETED | OUTPATIENT
Start: 2018-09-26 | End: 2018-09-26

## 2018-09-26 RX ORDER — AMOXICILLIN 250 MG
1 CAPSULE ORAL 2 TIMES DAILY PRN
Status: DISCONTINUED | OUTPATIENT
Start: 2018-09-26 | End: 2018-09-28 | Stop reason: HOSPADM

## 2018-09-26 RX ORDER — BENZONATATE 100 MG/1
100 CAPSULE ORAL 3 TIMES DAILY PRN
Status: DISCONTINUED | OUTPATIENT
Start: 2018-09-26 | End: 2018-09-27

## 2018-09-26 RX ADMIN — IPRATROPIUM BROMIDE AND ALBUTEROL SULFATE 6 ML: 2.5; .5 SOLUTION RESPIRATORY (INHALATION) at 15:48

## 2018-09-26 RX ADMIN — IPRATROPIUM BROMIDE AND ALBUTEROL SULFATE 6 ML: .5; 3 SOLUTION RESPIRATORY (INHALATION) at 15:48

## 2018-09-26 RX ADMIN — PYRIDOSTIGMINE BROMIDE 60 MG: 60 TABLET ORAL at 21:41

## 2018-09-26 RX ADMIN — AZITHROMYCIN MONOHYDRATE 500 MG: 500 INJECTION, POWDER, LYOPHILIZED, FOR SOLUTION INTRAVENOUS at 17:32

## 2018-09-26 RX ADMIN — METHYLPREDNISOLONE SODIUM SUCCINATE 40 MG: 40 INJECTION, POWDER, FOR SOLUTION INTRAMUSCULAR; INTRAVENOUS at 20:06

## 2018-09-26 RX ADMIN — ENOXAPARIN SODIUM 40 MG: 40 INJECTION SUBCUTANEOUS at 20:06

## 2018-09-26 RX ADMIN — POTASSIUM CHLORIDE 40 MEQ: 1.5 POWDER, FOR SOLUTION ORAL at 16:33

## 2018-09-26 RX ADMIN — METHYLPREDNISOLONE SODIUM SUCCINATE 125 MG: 125 INJECTION, POWDER, FOR SOLUTION INTRAMUSCULAR; INTRAVENOUS at 15:38

## 2018-09-26 ASSESSMENT — ACTIVITIES OF DAILY LIVING (ADL)
BATHING: 0-->INDEPENDENT
DRESS: 0-->INDEPENDENT
AMBULATION: 0-->INDEPENDENT
TOILETING: 0 - INDEPENDENT
FALL_HISTORY_WITHIN_LAST_SIX_MONTHS: NO
COMMUNICATION: 0 - UNDERSTANDS/COMMUNICATES WITHOUT DIFFICULTY
TRANSFERRING: 0 - INDEPENDENT
TRANSFERRING: 0-->INDEPENDENT
CHANGE_IN_FUNCTIONAL_STATUS_SINCE_ONSET_OF_CURRENT_ILLNESS/INJURY: NO
ADLS_ACUITY_SCORE: 13
DRESS: 0 - INDEPENDENT
TOILETING: 0-->INDEPENDENT
AMBULATION: 0 - INDEPENDENT
BATHING: 0 - INDEPENDENT
SWALLOWING: 0 - SWALLOWS FOODS/LIQUIDS WITHOUT DIFFICULTY
EATING: 0 - INDEPENDENT
RETIRED_COMMUNICATION: 0-->UNDERSTANDS/COMMUNICATES WITHOUT DIFFICULTY
RETIRED_EATING: 0-->INDEPENDENT

## 2018-09-26 ASSESSMENT — ENCOUNTER SYMPTOMS
VOMITING: 0
DIARRHEA: 0
MYALGIAS: 0
FEVER: 1
DYSURIA: 0
ARTHRALGIAS: 0
CONSTIPATION: 0
HEMATURIA: 0
DIFFICULTY URINATING: 0
CHEST TIGHTNESS: 1
BLOOD IN STOOL: 0
SHORTNESS OF BREATH: 1
NAUSEA: 0
ANAL BLEEDING: 0

## 2018-09-26 NOTE — PHARMACY-ADMISSION MEDICATION HISTORY
Admission medication history interview status for this patient is complete. See [facility] admission navigator for allergy information, prior to admission medications and immunization status.     Medication history interview source(s):Patient  Medication history resources (including written lists, pill bottles, clinic record):Care Everywhere  Primary pharmacy:[facility]    Changes made to PTA medication list:  Added: Nasonex, imuran  Deleted: Prednisone  Changed: -    Actions taken by pharmacist (provider contacted, etc):None     Additional medication history information:None    Medication reconciliation/reorder completed by provider prior to medication history? No    Do you take OTC medications (eg tylenol, ibuprofen, fish oil, eye/ear drops, etc)? Y    Prior to Admission medications    Medication Sig Last Dose Taking? Auth Provider   aspirin EC 81 MG EC tablet Take 81 mg by mouth every evening  9/25/2018 at pm Yes Reported, Patient   atorvastatin (LIPITOR) 10 MG tablet Take 10 mg by mouth daily  9/26/2018 at am Yes Reported, Patient   azaTHIOprine (IMURAN) 50 MG tablet Take 50 mg by mouth 3 times daily 9/26/2018 at x2 Yes Unknown, Entered By History   exenatide (BYETTA 10 MCG PEN) 10 MCG/0.04ML injection Inject 10 mcg Subcutaneous 2 times daily (before meals)  9/26/2018 at am Yes Reported, Patient   losartan-hydrochlorothiazide (HYZAAR) 50-12.5 MG per tablet Take 1 tablet by mouth 2 times daily  9/26/2018 at am Yes Reported, Patient   metFORMIN (GLUCOPHAGE) 1000 MG tablet Take 1,500 mg by mouth daily (with dinner) 9/25/2018 at pm Yes Unknown, Entered By History   metFORMIN (GLUCOPHAGE) 1000 MG tablet Take 1,000 mg by mouth daily (with breakfast)  9/26/2018 at am Yes Reported, Patient   pyridostigmine (MESTINON) 60 MG tablet Take 1 tablet (60 mg) by mouth 3 times daily 9/26/2018 at x2 Yes Paolo Guzmán MD   metroNIDAZOLE (METROGEL) 1 % gel Apply topically as needed Unknown at Unknown time  Unknown, Entered  By History   mometasone (NASONEX) 50 MCG/ACT spray Spray 2 sprays into both nostrils daily as needed Unknown at Unknown time  Unknown, Entered By History

## 2018-09-26 NOTE — ED TRIAGE NOTES
Patient presents to ED via EMS from [facility] due SOB. Per EMS report patient has been c/o cold like symptoms for the past week. Seen by PCP today for c/o reported to have worsened SOB past 3 days and low grade temp.  Was tachypneic and given duo neb at clinic, oxygen 91 % on scene and c/o throat tightness after receiving duo neb. Improved after receiving oxygen at  15L per mask.       On arrival patient denied CP, tachypneic.

## 2018-09-26 NOTE — H&P
[facility]  Hospitalist Admission Note  Name: Fidel Dailey    MRN: [id number]  YOB: 1960    Age: 57 year old  Date of admission: 9/26/2018  Primary care provider: Tyree Alcantara            Assessment and Plan:   Fidel Dailey is a 57 year old male with long-standing history of non-insulin requiring diabetes mellitus, hypertension, myasthenia gravis on maintenance Mestinon with no recent flareup and usual presentation are ocular muscle weakness most pronounced in the afternoon and currently living independently at home with his wife and presented today in the emergency room due to worsening shortness of breath, decreasing exercise tolerance over the past several days duration    1.  Acute respiratory distress, no clear documentation of hypoxia earlier on BiPAP  2.  Multiple wheezing with tight airway suspected bronchospasm from likely recent viral bronchitis  3.  History of myasthenia gravis on maintenance Mestinon.  He cannot recall any prior myasthenia crisis or flareup.  However this should be considered in the setting of earlier respiratory distress with likely precipitant of this recent viral bronchitis.  -He is currently off BiPAP, feels a whole lot better, not even complaining of any ocular muscle weakness.  Need to closely monitor and if has progressive respiratory distress and requiring increasing oxygen supplementation then low threshold for intubation.  -Chest x-ray are not showing any signs signs of volume overload or infectious process.  He has normal white blood cell count.  -Pro-calcitonin to be added  -Stop Zithromax as macrolides can also be a precipitant for myasthenia flare.  Avoid quinolones  -He is still n.p.o. as BiPAP just recently taken off.  He can be provided with a diet later in the night if he continues to demonstrate to be off BiPAP.  -Hold his usual home regimen for diabetes while on BiPAP.  -Anticipate uncontrolled glucose levels while on IV  steroids  -I have requested formal neurology evaluation given history of myasthenia in the setting of early respiratory distress.  -Continue with ICU inpatient care as he remained high risk for clinical deterioration.  -Current working diagnosis, plan of care, indication for hospitalization were discussed in detail with him and his wife Grace.  They were given the opportunity to ask questions that I have answered the best of my ability.    Critical care time spent more than 35 minutes    Code status: Full code  Admit to inpatient  Prophylaxis: Lovenox  Disposition: Home likely in the next 2 days          Chief Complaint:   Increasing SOB over the course of several days duration.         Source of Information:   Patient with good reliability, wife at bedside with good reliability  Discussion with ED physician  Review of E chart records         History of Present Illness:   Fidel Dailey is a 57 year old male with long-standing history of non-insulin requiring diabetes mellitus, hypertension, myasthenia gravis on maintenance Mestinon with no recent flareup and usual presentation are ocular muscle weakness most pronounced in the afternoon and currently living independently at home with his wife and presented today in the emergency room due to worsening shortness of breath, decreasing exercise tolerance over the past several days duration.  He was initially seen in the urgent care but found with borderline oxygen levels and see him as having respiratory distress hence referred to the ER for further management care.  Sd stated that approximately 1 week prior to this hospitalization he has been having congestion symptoms such as colds, coughing spells, and nasal dripping but he thought that it was probably a allergy symptoms and had some improvement over the weekend where he was actually able to attend and watch live the [employer] game.  Soon after he started to have some worsening shortness of breath,  feeling fatigue and tired even after walking at home doing his routine activities.  No reported mental status changes, no chest pain, nausea, vomiting, chills, back pain, urinary symptomatology, bleeding tendencies, diarrhea, or fall event.    He does not think that his myasthenia was worsening and is still taking his medications (Mestinon) and no unusual changes with his ocular muscle strength.    Upon presentation the emergency room he started to have some sore throat and chest tightness most pronounced after he receive a DuoNeb breathing treatment earlier.   Further exam showed tight air entry and numerous scattered wheezing prompting him to be place on bilevel BAP, IV corticosteroids provided, and referred to us for further management care.   Chest x-ray imaging showed no acute abnormality, no significant acidosis, lactic acid normal, proBNP normal, d-dimer negative, no significant leukocytosis seen.   During the time my exam BiPAP was discontinued and he is able to tolerate it even without oxygen supplementation.  He started to feel a whole lot better and denies any underlying chest tightness, worsening shortness of breath but still exhibiting multiple coughing spells.             Past Medical History:     Past Medical History:   Diagnosis Date     Diabetes (H)      Hypertension     controlled on medication     Myasthenia gravis (H)              Past Surgical History:     Past Surgical History:   Procedure Laterality Date     ARTHROSCOPY KNEE Right     done twice (late 1980's and then 1999)             Social History:     Social History   Substance Use Topics     Smoking status: Never Smoker     Smokeless tobacco: Never Used     Alcohol use No             Family History:   Family history was fully reviewed and non-contributory in this case.         Allergies:     Allergies   Allergen Reactions     Seasonal Allergies              Medications:     Prior to Admission medications    Medication Sig Last Dose Taking?  Auth Provider   aspirin EC 81 MG EC tablet Take 81 mg by mouth every evening  9/25/2018 at pm Yes Reported, Patient   atorvastatin (LIPITOR) 10 MG tablet Take 10 mg by mouth daily  9/26/2018 at am Yes Reported, Patient   azaTHIOprine (IMURAN) 50 MG tablet Take 50 mg by mouth 3 times daily 9/26/2018 at x2 Yes Unknown, Entered By History   exenatide (BYETTA 10 MCG PEN) 10 MCG/0.04ML injection Inject 10 mcg Subcutaneous 2 times daily (before meals)  9/26/2018 at am Yes Reported, Patient   losartan-hydrochlorothiazide (HYZAAR) 50-12.5 MG per tablet Take 1 tablet by mouth 2 times daily  9/26/2018 at am Yes Reported, Patient   metFORMIN (GLUCOPHAGE) 1000 MG tablet Take 1,500 mg by mouth daily (with dinner) 9/25/2018 at pm Yes Unknown, Entered By History   metFORMIN (GLUCOPHAGE) 1000 MG tablet Take 1,000 mg by mouth daily (with breakfast)  9/26/2018 at am Yes Reported, Patient   pyridostigmine (MESTINON) 60 MG tablet Take 60 mg by mouth 3 times daily 9/26/2018 at x2 Yes Unknown, Entered By History   pyridostigmine (MESTINON) 60 MG tablet Take 1 tablet (60 mg) by mouth 3 times daily 9/26/2018 at x2 Yes Paolo Guzmán MD   metroNIDAZOLE (METROGEL) 1 % gel Apply topically as needed Unknown at Unknown time  Unknown, Entered By History   mometasone (NASONEX) 50 MCG/ACT spray Spray 2 sprays into both nostrils daily as needed Unknown at Unknown time  Unknown, Entered By History             Review of Systems:   A Comprehensive greater than 10 system review of systems was carried out.  Pertinent positives and negatives are noted above.  Otherwise negative for contributory information.           Physical Exam:   Blood pressure 127/76, temperature 100.3  F (37.9  C), temperature source Temporal, resp. rate (!) 40, SpO2 95 %.  Wt Readings from Last 1 Encounters:   No data found for Wt     Exam:  GENERAL: No apparent distress. Awake, alert, and fully oriented.  HEENT: Normocephalic, atraumatic. Extraocular movements  intact.  CARDIOVASCULAR: Regular rate and rhythm without murmurs or rubs. No JVD  PULMONARY: Fair air entry, mild scattered wheezes, no crackles, no rhonchi  ABDOMINAL: Soft, non-tender, non-distended. Bowel sounds normoactive. No hepatosplenomegaly.  EXTREMITIES: No cyanosis or clubbing. No edema.  NEUROLOGICAL: CN 2-12 grossly intact, awake and alert x3, spontaneous and coherent speech. no focal neurological deficits.  DERMATOLOGICAL: No rash, ulcer, ecchymoses, jaundice.  Psych: not agitation, not combative, pleasant mood  Lymph nodes: no obvious palpable  cervical or axillary lymphadenopathy         Data:   EKG:    Normal sinus rhythm at 90 bpm    Imaging:  Recent Results (from the past 48 hour(s))   XR Chest Port 1 View    Narrative    CHEST ONE VIEW PORTABLE    9/26/2018 3:47 PM     HISTORY: Cough, shortness of breath.     COMPARISON: None.    FINDINGS: Upright portable chest. The heart size is normal. Allowing  for low volumes, the lungs are clear. No pneumothorax.      Impression    IMPRESSION: No acute abnormality.    MENDOZA BRODERICK MD       Labs:  No results for input(s): CULT in the last 168 hours.  No results for input(s): PH, PHARTERIAL, PO2, [id number], SAT, PCO2, HCO3, BASEEXCESS, MARIE, BEB in the last 168 hours.    Invalid input(s): [id number]    Recent Labs  Lab 09/26/18  1546   WBC 10.1   HGB 14.5   HCT 45.4   MCV 86          Recent Labs  Lab 09/26/18  1546 09/26/18  1545     --    POTASSIUM 3.1*  --    CHLORIDE 102  --    CO2 30  --    ANIONGAP 8  --    *  --    BUN 18  --    CR 0.89  --    GFRESTIMATED 88 87   GFRESTBLACK >90 >90   ADRIA 9.3  --      No results for input(s): SED, CRP in the last 168 hours.  No results for input(s): INR in the last 168 hours.    Recent Labs  Lab 09/26/18  1546   TROPI <0.015     No results for input(s): COLOR, APPEARANCE, URINEGLC, URINEBILI, URINEKETONE, SG, UBLD, URINEPH, PROTEIN, UROBILINOGEN, NITRITE, LEUKEST, RBCU, WBCU in the last 168  hours.

## 2018-09-26 NOTE — PROGRESS NOTES
Respiratory Therapy    Patient taken off of BIPAP and placed onto room air SpO2 92-98%. MD in room. BiPAP taken to ICU for S/B if patient needs and will patient uses CPAP at night for KENDRA.    Breann Read RRT  9/26/2018

## 2018-09-26 NOTE — ED NOTES
[facility]  ED Nurse Handoff Report    Fidel Dailey is a 57 year old male   ED Chief complaint: Shortness of Breath  . ED Diagnosis:   Final diagnoses:   None     Allergies:   Allergies   Allergen Reactions     Seasonal Allergies        Code Status: Full Code  Activity level - Baseline/Home:  Independent. Activity Level - Current:   Stand with Assist. Lift room needed: No. Bariatric: No   Needed: No   Isolation: No. Infection: Not Applicable.     Vital Signs:   Vitals:    09/26/18 1614 09/26/18 1615 09/26/18 1630 09/26/18 1645   BP:   129/79 127/76   Resp:       Temp:       TempSrc:       SpO2: 97% 98% 97% 94%       Cardiac Rhythm:  ,      Pain level:    Patient confused: No. Patient Falls Risk: Yes.   Elimination Status: has not voided   Patient Report - Initial Complaint: SOB  Focused Assessment: Patient presents to ED via EMS from [facility] due SOB. Per EMS report patient has been c/o cold like symptoms for the past week. Seen by PCP today for c/o reported to have worsened SOB past 3 days and low grade temp.  Was tachypneic and given duo neb at clinic, oxygen 91 % on scene and c/o throat tightness after receiving duo neb. Improved after receiving oxygen at  15L per mask.       On arrival patient denied CP, tachypneic.    Respiratory assessment : on arrival to ED bilateral wheezing present in all fields and tachypneic , after 6mL duo neb lung sounds diminished but wheezing improved   Placed back on BIPAP   Tests Performed: CBC, BC x2 , troponin, chest 1view xray, VBG, BMP, BNP, Lactic, D- Dimer  . Abnormal Results:    Blood gas venous and oxyhgb Resulted Abnormal Result -   Ph Venous: 7.30 pH [Ref Range: 7.32 - 7.43]  PCO2 Venous: 54 mm Hg [Ref Range: 40 - 50]  PO2 Venous: 21 mm Hg [Ref Range: 25 - 47]  Bicarbonate Venous: 26      Basic metabolic panel Resulted Abnormal Result -   Sodium: 140 mmol/L [Ref Range: 133 - 144]  Potassium: 3.1 mmol/L [Ref Range: 3.4 - 5.3]  Chloride: 102 mmol/L  [Ref Range: 94 - 109]  Carbon Dioxide: 30 mmol/L [Ref Range: 20 - 32]  Anion Gap: 8 mmol/L [Ref Range: 3 - 14]  Glucose: 140 mg/dL [Ref Range: 70 - 99]  Urea Nitrogen: 18 mg/dL [Ref        Treatments provided:   Potassium   Solu medrol   Duo-neb     Family Comments: Sherrell wife at bedside  OBS brochure/video discussed/provided to patient:  N/A  ED Medications:   Medications   ipratropium - albuterol 0.5 mg/2.5 mg/3 mL (DUONEB) neb solution 6 mL (6 mLs Nebulization Given 9/26/18 1548)   methylPREDNISolone sodium succinate (solu-MEDROL) injection 125 mg (125 mg Intravenous Given 9/26/18 1538)   potassium chloride (KLOR-CON) Packet 40 mEq (40 mEq Oral Given 9/26/18 1633)     Drips infusing:  No  For the majority of the shift, the patient's behavior Green. Interventions performed were NA.     Severe Sepsis OR Septic Shock Diagnosis Present: No      ED Nurse Name/Phone Number: Juana Hoffmann,   4:52 PM

## 2018-09-26 NOTE — ED NOTES
Per MD Otto  Request, patient able to take home med mestinon. Patient tolerated well . Place back on BIPAP. Has no other immediate needs at this time    Will continue to monitor at this time

## 2018-09-26 NOTE — IP AVS SNAPSHOT
MRN:[id number]                      After Visit Summary   9/26/2018    Fidel Dailey    MRN: [id number]           Thank you!     Thank you for choosing [facility] for your care. Our goal is always to provide you with excellent care. Hearing back from our patients is one way we can continue to improve our services. Please take a few minutes to complete the written survey that you may receive in the mail after you visit. If you would like to speak to someone directly about your visit please contact Patient Relations at [phone]. Thank you!          Patient Information     Date Of Birth          1960        Designated Caregiver       Most Recent Value    Caregiver    Name of designated caregiver eduardo      About your hospital stay     You were admitted on:  September 26, 2018 You last received care in the:  [place] [address] [facility]    You were discharged on:  September 28, 2018        Reason for your hospital stay       Acute hypoxemic respiratory failure with RAD due to viral infection. Worsening of MG.                  Who to Call     For medical emergencies, please call 911.  For non-urgent questions about your medical care, please call your primary care provider or clinic, [phone]          Attending Provider     Provider Specialty    Judy Otto MD Emergency Medicine    Glenn Rebolledo MD Internal Medicine       Primary Care Provider Office Phone # Fax #    Tyree Alcantara [phone]      After Care Instructions     Activity       Your activity upon discharge: activity as tolerated            Diet       Follow this diet upon discharge:        Moderate Consistent CHO Diet                  Follow-up Appointments     Follow-up and recommended labs and tests        Follow up with primary care provider, Tyree Alcantara, within 7 days for hospital follow- up.  No follow up labs or test are needed.  Neurology in 4 weeks              "     Pending Results     Date and Time Order Name Status Description    9/26/2018 1549 Blood culture Preliminary     9/26/2018 1537 Blood culture Preliminary             Statement of Approval     Ordered          09/28/18 1300  I have reviewed and agree with all the recommendations and orders detailed in this document.  EFFECTIVE NOW     Approved and electronically signed by:  Jagdish Giordano MD             Admission Information     Date & Time Provider Department Dept. Phone    9/26/2018 Glenn Rebolledo MD [place] [address] [facility] [phone]      Your Vitals Were     Blood Pressure Pulse Temperature Respirations Height Weight    111/57 (BP Location: Right arm) 89 97.4  F (36.3  C) (Oral) 20 1.702 m (5' 7\") 119.6 kg (263 lb 9.6 oz)    Pulse Oximetry BMI (Body Mass Index)                95% 41.29 kg/m2          MyChart Information     [employer] gives you secure access to your electronic health record. If you see a primary care provider, you can also send messages to your care team and make appointments. If you have questions, please call your primary care clinic.  If you do not have a primary care provider, please call [phone] and they will assist you.        Care EveryWhere ID     This is your Care EveryWhere ID. This could be used by other organizations to access your [place] medical records  [id number]        Equal Access to Services     THOMAS ZIEGLER AH: Alberto friedman Solis, waaxda luqadaha, qaybta kaalmada leydi, jone dee. So [facility] [phone].    ATENCIÓN: Si habla español, tiene a mehta disposición servicios gratuitos de asistencia lingüística. Llame al [phone].    We comply with applicable federal civil rights laws and [place] laws. We do not discriminate on the basis of race, color, national origin, age, disability, sex, sexual orientation, or gender identity.               Review of your medicines      START taking        Dose / " Directions    predniSONE 20 MG tablet   Commonly known as:  DELTASONE   Used for:  Acute bronchitis, unspecified organism   Notes to Patient:  Take as directed per directions.        Take 2 tabs (40 mg) daily x 3 days, 1 tab (20 mg) daily x 3 days, then 1/2 tab (10 mg) x 3 days.   Quantity:  20 tablet   Refills:  0         CONTINUE these medicines which have NOT CHANGED        Dose / Directions    aspirin 81 MG EC tablet        Dose:  81 mg   Take 81 mg by mouth every evening   Refills:  0       atorvastatin 10 MG tablet   Commonly known as:  LIPITOR        Dose:  10 mg   Take 10 mg by mouth daily   Refills:  0       azaTHIOprine 50 MG tablet   Commonly known as:  IMURAN        Dose:  50 mg   Take 50 mg by mouth 3 times daily   Refills:  0       BYETTA 10 MCG PEN 10 MCG/0.04ML injection   Generic drug:  exenatide        Dose:  10 mcg   Inject 10 mcg Subcutaneous 2 times daily (before meals)   Refills:  0       losartan-hydrochlorothiazide 50-12.5 MG per tablet   Commonly known as:  HYZAAR        Dose:  1 tablet   Take 1 tablet by mouth 2 times daily   Refills:  0       * metFORMIN 1000 MG tablet   Commonly known as:  GLUCOPHAGE        Dose:  1500 mg   Take 1,500 mg by mouth daily (with dinner)   Refills:  0       * metFORMIN 1000 MG tablet   Commonly known as:  GLUCOPHAGE        Dose:  1000 mg   Take 1,000 mg by mouth daily (with breakfast)   Refills:  0       metroNIDAZOLE 1 % gel   Commonly known as:  METROGEL        Apply topically as needed   Refills:  0       mometasone 50 MCG/ACT spray   Commonly known as:  NASONEX        Dose:  2 spray   Spray 2 sprays into both nostrils daily as needed   Refills:  0       pyridostigmine 60 MG tablet   Commonly known as:  MESTINON        Dose:  60 mg   Take 60 mg by mouth 3 times daily   Refills:  0       * Notice:  This list has 2 medication(s) that are the same as other medications prescribed for you. Read the directions carefully, and ask your doctor or other care provider  to review them with you.         Where to get your medicines      Some of these will need a paper prescription and others can be bought over the counter. Ask your nurse if you have questions.     You don't need a prescription for these medications     predniSONE 20 MG tablet                Protect others around you: Learn how to safely use, store and throw away your medicines at www.disposemymeds.org.             Medication List: This is a list of all your medications and when to take them. Check marks below indicate your daily home schedule. Keep this list as a reference.      Medications           Morning Afternoon Evening Bedtime As Needed    aspirin 81 MG EC tablet   Take 81 mg by mouth every evening   Last time this was given:  81 mg on 9/27/2018  9:56 PM   Next Dose Due:  Take tonight 9/28                                   atorvastatin 10 MG tablet   Commonly known as:  LIPITOR   Take 10 mg by mouth daily   Last time this was given:  10 mg on 9/28/2018  8:19 AM   Next Dose Due:  Tmrrw 9/29 am                                   azaTHIOprine 50 MG tablet   Commonly known as:  IMURAN   Take 50 mg by mouth 3 times daily   Last time this was given:  50 mg on 9/28/2018  8:19 AM   Next Dose Due:  Due today 9/28                                         BYETTA 10 MCG PEN 10 MCG/0.04ML injection   Inject 10 mcg Subcutaneous 2 times daily (before meals)   Last time this was given:  10 mcg on 9/27/2018  6:00 PM   Generic drug:  exenatide   Next Dose Due:  Today before dinner 9/28                                      losartan-hydrochlorothiazide 50-12.5 MG per tablet   Commonly known as:  HYZAAR   Take 1 tablet by mouth 2 times daily   Last time this was given:  1 tablet on 9/28/2018  8:19 AM   Next Dose Due:  Due today 9/28                                      * metFORMIN 1000 MG tablet   Commonly known as:  GLUCOPHAGE   Take 1,500 mg by mouth daily (with dinner)   Next Dose Due:  Medication not given in hospital. Resume  medication per home directions.                                     * metFORMIN 1000 MG tablet   Commonly known as:  GLUCOPHAGE   Take 1,000 mg by mouth daily (with breakfast)   Next Dose Due:  Medication not given in hospital. Resume medication per home directions.                                     metroNIDAZOLE 1 % gel   Commonly known as:  METROGEL   Apply topically as needed   Next Dose Due:  Medication not given in hospital. Resume medication per home directions.                                     mometasone 50 MCG/ACT spray   Commonly known as:  NASONEX   Spray 2 sprays into both nostrils daily as needed   Next Dose Due:  Medication not given in hospital. Resume medication per home directions.                                     predniSONE 20 MG tablet   Commonly known as:  DELTASONE   Take 2 tabs (40 mg) daily x 3 days, 1 tab (20 mg) daily x 3 days, then 1/2 tab (10 mg) x 3 days.   Last time this was given:  40 mg on 9/28/2018  8:20 AM   Next Dose Due:  Due today 9/28   Notes to Patient:  Take as directed per directions.                                pyridostigmine 60 MG tablet   Commonly known as:  MESTINON   Take 60 mg by mouth 3 times daily   Last time this was given:  60 mg on 9/28/2018  8:20 AM   Next Dose Due:  Due today 9/28                                         * Notice:  This list has 2 medication(s) that are the same as other medications prescribed for you. Read the directions carefully, and ask your doctor or other care provider to review them with you.

## 2018-09-26 NOTE — PROGRESS NOTES
"Respiratory Therapy    Patient was given 6mL of duoneb, but reported throat tightness 1 & 3/4 through neb tx. Tx stopped and patient placed onto bipap with RR's still in the mid to high 30's, but reported feeling \"a little better\". SpO2 on 35% FiO2 is 98%. Lung sounds diminished with faint end expiratory wheezing. Will continue to assess and monitor.    Breann Read RRT  9/26/2018    "

## 2018-09-26 NOTE — ED PROVIDER NOTES
History     Chief Complaint:  Shortness of Breath    HPI   Fidel Dailey is a 57 year old male with a history of diabetes, hypertension, and myasthenia gravis who presents to the emergency department today with shortness of breath. The patient was delivered to the emergency department via EMS. Per EMS, the patient has been experiencing cough and flu like symptoms for the last week and then starting on Monday, began to experience shortness of breath, which has progressively worsened. He presented to the clinic earlier today with shortness of breath where his initial SpO2 was 91% and temperature was 100.4F. EMS gave the patient a duoneb and he stated that when they began to give him the medication, he started to have some throat tightness although his SpO2% increased to 97% and has remained in the high 90s. The patient denies any chest pain, leg pain or swelling, nausea, vomiting, urinary symptoms, or GI symptoms. Further, the patient denies any history of blood clots or any other symptoms. No recent travel, hospitalization or immobilization.     Allergies:  No Known Drug Allergies    Medications:    Aspirin   Lipitor   Byetta 10mcg pen   Hyzaar  Metformin  Deltasone   Mestinon    Past Medical History:    Diabetes   Hypertension     Past Surgical History:    Arthroscopy knee x2    Family History:    Glaucoma     Social History:  The patient was accompanied to the ED by his wife.  Smoking Status: Never smoker   Smokeless Tobacco: Never used   Marital Status:       Review of Systems   Constitutional: Positive for fever.   Respiratory: Positive for chest tightness and shortness of breath.    Gastrointestinal: Negative for anal bleeding, blood in stool, constipation, diarrhea, nausea and vomiting.   Genitourinary: Negative for difficulty urinating, dysuria and hematuria.   Musculoskeletal: Negative for arthralgias and myalgias.   All other systems reviewed and are negative.    Physical Exam   First  Vitals:  Patient Vitals for the past 24 hrs:   BP Temp Temp src Heart Rate Resp SpO2   09/26/18 1645 127/76 - - 81 - 94 %   09/26/18 1630 129/79 - - 84 - 97 %   09/26/18 1615 - - - 78 - 98 %   09/26/18 1614 - - - 83 - 97 %   09/26/18 1613 - - - 81 - 97 %   09/26/18 1605 - 100.3  F (37.9  C) Temporal - - -   09/26/18 1600 135/83 - - 86 - 97 %   09/26/18 1559 - - - - - 97 %   09/26/18 1558 - - - - - 99 %   09/26/18 1557 - - - - - 100 %   09/26/18 1556 - - - - - 97 %   09/26/18 1555 - - - - - 97 %   09/26/18 1554 - - - - - 97 %   09/26/18 1545 (!) 128/103 - - 90 - 98 %   09/26/18 1539 (!) 127/96 - - - - -   09/26/18 1530 - - - 93 (!) 40 98 %       Physical Exam     Gen: alert  HEENT: PERRL, oropharynx clear  Neck: normal ROM  CV: RRR, no murmurs, 2+ distal pulses in all 4 extremities  Chest: no tenderness over the chest wall  Pulm: breath sounds equal, patient speaks in short sentences, tachypneic, bilateral inspiratory and expiratory wheeze  Abd: Soft, nontender  Back: no evidence of injury  MSK: no lower extremity edema, no calf tenderness  Skin: no rash  Neuro: alert, appropriate conversation and interaction, no drooping of eyelids, EOMI, moves all extremities without focal weakness      Emergency Department Course   ECG:  Indication: shortness of breath   Completed at 1535.  Read at 1546.   Normal sinus rhythm   Normal ECG    Rate 89 bpm. SD interval 170. QRS duration 96. QT/QTc 366/445. P-R-T axes 36 2 34.    Imaging:  Radiology findings were communicated with the patient and family who voiced understanding of the findings.    XR Chest Port 1 View:   Impression: No acute abnormality   Report per radiology     Laboratory:  Laboratory findings were communicated with the patient and family who voiced understanding of the findings.    Blood gas venous and oxyhgb; pH venous 7.30 (L), PCO2 Venous 54 (H), PO2 venous 21 (L)    CBC: AWNL. (WBC 10.1, HGB 14.5, )     BMP: Pottassium 3.1 (L), Glucose 140 (H) o/wWNL  (Creatinine 0.89)    Troponin (Collected 1546): <0.015    BNP: 37    Blood culture: Pending     D dimer quantitative: 0.4    Creatinine: 0.9    Lactic Acid: 1.8    Interventions:  1548: Duoneb 6 mL nebulization  1538: Methylprednisolone sodium succinate 125 mg IV      Emergency Department Course:  Nursing notes and vitals reviewed.    1525:the patient arrived via EMS     1530: I performed an exam of the patient as documented above.     1533: IV was placed     1546: Patient rechecked and updated. Patient wheezing improved although is still struggling with chest tightness. The patient improved after the respiratory treatment was given.       1903: Patient rechecked and updated.     Findings and plan explained to the Patient and spouse who consents to admission. Discussed the patient with Dr. Santos, who will admit the patient to an ICU bed for further monitoring, evaluation, and treatment.    I personally reviewed the laboratory and imaging results with the Patient and spouse and answered all related questions prior to admission.    Impression & Plan    Medical Decision Making:  Fidel Dailey is a 57 year old male who presents to the emergency department today with shortness of breath. URI symptoms precede this. Evaluation today shows respiratory failure likely due to bronchitis. Upon arrival,the patient had diffuse expiratory wheezing and shortness of breath. This responded nicely to neb, steroids, and placement on BiPAP.The patient was monitored here in the emergency department for approximately two hours which showed good stability of respiratory status. Chest x ray was obtained and showed no other abnormalities and no other focal infiltrate. The patient's laboratory studies returned unremarkable. Notably, the patient denies having any chest pain. EKG showed no ischemia. Troponin is negative in the setting of days of ongoing symptoms therefore excluding ACS as the likely cause of his symptoms. No lower  extremity edema or pulmonary edema on Chest xray therefore I doubt CHF is the cause of his wheezing. I suspect that this is more likely infectious cause. We will cover with azithromycin for potential atypical cause of symptoms. The patient does have a history of myasthenia gravis. He has been compliant with his mestinon. I added an extra dose upon arrival (pt took home dose) for stress dosing. I did serial evaluations during the emergency department and he did not appear to have respiratory muscle weakness nor ocular muscle weakness to suggest myasthenia crisis.  His respiratory symptoms improved with treatment of his wheezing. This will of course will need to be carefully monitored in the setting of acute illness. The patient was discussed with Danielle Diana and admitted to the ICU.       Critical care time 30 minutes exclusive of procedures in management of acute respiratory failure with BiPAP, multiple patient evaluations to follow respiratory status.       Diagnosis:    ICD-10-CM    1. Acute respiratory failure with hypoxia (H) J96.01    2. Acute bronchitis, unspecified organism J20.9        Disposition:  Admitted to ICU    Anthony Obando  9/26/2018   [facility] EMERGENCY DEPARTMENT  Scribe Disclosure:  I, Anthony Obando, am serving as a scribe at 3:28 PM on 9/26/2018 to document services personally performed by No att. providers found based on my observations and the provider's statements to me.        Judy Otto MD  09/26/18 7749

## 2018-09-26 NOTE — IP AVS SNAPSHOT
[place] [address] [facility]    [address]    [facility] [address]    Phone:  [phone]    Fax:  [phone]                                       After Visit Summary   9/26/2018    Fidel Dailey    MRN: [id number]           After Visit Summary Signature Page     I have received my discharge instructions, and my questions have been answered. I have discussed any challenges I see with this plan with the nurse or doctor.    ..........................................................................................................................................  Patient/Patient Representative Signature      ..........................................................................................................................................  Patient Representative Print Name and Relationship to Patient    ..................................................               ................................................  Date                                   Time    ..........................................................................................................................................  Reviewed by Signature/Title    ...................................................              ..............................................  Date                                               Time          22EPIC Rev 08/18

## 2018-09-27 LAB
GLUCOSE BLDC GLUCOMTR-MCNC: 209 MG/DL (ref 70–99)
GLUCOSE BLDC GLUCOMTR-MCNC: 256 MG/DL (ref 70–99)
INTERPRETATION ECG - MUSE: NORMAL
MAGNESIUM SERPL-MCNC: 1.6 MG/DL (ref 1.6–2.3)
MRSA DNA SPEC QL NAA+PROBE: NEGATIVE
POTASSIUM SERPL-SCNC: 3.8 MMOL/L (ref 3.4–5.3)
SPECIMEN SOURCE: NORMAL

## 2018-09-27 PROCEDURE — 25000132 ZZH RX MED GY IP 250 OP 250 PS 637: Performed by: HOSPITALIST

## 2018-09-27 PROCEDURE — 25000132 ZZH RX MED GY IP 250 OP 250 PS 637: Performed by: INTERNAL MEDICINE

## 2018-09-27 PROCEDURE — 25000125 ZZHC RX 250: Performed by: HOSPITALIST

## 2018-09-27 PROCEDURE — 94660 CPAP INITIATION&MGMT: CPT

## 2018-09-27 PROCEDURE — 12000000 ZZH R&B MED SURG/OB

## 2018-09-27 PROCEDURE — 94150 VITAL CAPACITY TEST: CPT

## 2018-09-27 PROCEDURE — 94640 AIRWAY INHALATION TREATMENT: CPT | Mod: 76

## 2018-09-27 PROCEDURE — 99232 SBSQ HOSP IP/OBS MODERATE 35: CPT | Performed by: ANESTHESIOLOGY

## 2018-09-27 PROCEDURE — 25000131 ZZH RX MED GY IP 250 OP 636 PS 637: Performed by: HOSPITALIST

## 2018-09-27 PROCEDURE — 25000128 H RX IP 250 OP 636: Performed by: INTERNAL MEDICINE

## 2018-09-27 PROCEDURE — 00000146 ZZHCL STATISTIC GLUCOSE BY METER IP

## 2018-09-27 PROCEDURE — 40000275 ZZH STATISTIC RCP TIME EA 10 MIN

## 2018-09-27 PROCEDURE — 94640 AIRWAY INHALATION TREATMENT: CPT

## 2018-09-27 PROCEDURE — 99233 SBSQ HOSP IP/OBS HIGH 50: CPT | Performed by: HOSPITALIST

## 2018-09-27 RX ORDER — CODEINE PHOSPHATE AND GUAIFENESIN 10; 100 MG/5ML; MG/5ML
5 SOLUTION ORAL EVERY 4 HOURS PRN
Status: DISCONTINUED | OUTPATIENT
Start: 2018-09-27 | End: 2018-09-28 | Stop reason: HOSPADM

## 2018-09-27 RX ORDER — FLUTICASONE PROPIONATE 50 MCG
2 SPRAY, SUSPENSION (ML) NASAL DAILY
Status: DISCONTINUED | OUTPATIENT
Start: 2018-09-27 | End: 2018-09-28 | Stop reason: HOSPADM

## 2018-09-27 RX ORDER — IPRATROPIUM BROMIDE AND ALBUTEROL SULFATE 2.5; .5 MG/3ML; MG/3ML
3 SOLUTION RESPIRATORY (INHALATION)
Status: DISCONTINUED | OUTPATIENT
Start: 2018-09-27 | End: 2018-09-28 | Stop reason: HOSPADM

## 2018-09-27 RX ORDER — ATORVASTATIN CALCIUM 10 MG/1
10 TABLET, FILM COATED ORAL DAILY
Status: DISCONTINUED | OUTPATIENT
Start: 2018-09-27 | End: 2018-09-28 | Stop reason: HOSPADM

## 2018-09-27 RX ORDER — LOSARTAN POTASSIUM AND HYDROCHLOROTHIAZIDE 12.5; 5 MG/1; MG/1
1 TABLET ORAL 2 TIMES DAILY
Status: DISCONTINUED | OUTPATIENT
Start: 2018-09-27 | End: 2018-09-28 | Stop reason: HOSPADM

## 2018-09-27 RX ORDER — PREDNISONE 20 MG/1
40 TABLET ORAL DAILY
Status: DISCONTINUED | OUTPATIENT
Start: 2018-09-27 | End: 2018-09-28 | Stop reason: HOSPADM

## 2018-09-27 RX ORDER — AZATHIOPRINE 50 MG/1
50 TABLET ORAL 3 TIMES DAILY
Status: DISCONTINUED | OUTPATIENT
Start: 2018-09-27 | End: 2018-09-28 | Stop reason: HOSPADM

## 2018-09-27 RX ORDER — BENZONATATE 100 MG/1
100 CAPSULE ORAL 3 TIMES DAILY PRN
Status: DISCONTINUED | OUTPATIENT
Start: 2018-09-27 | End: 2018-09-28 | Stop reason: HOSPADM

## 2018-09-27 RX ORDER — ASPIRIN 81 MG/1
81 TABLET ORAL EVERY EVENING
Status: DISCONTINUED | OUTPATIENT
Start: 2018-09-27 | End: 2018-09-28 | Stop reason: HOSPADM

## 2018-09-27 RX ORDER — MOMETASONE FUROATE MONOHYDRATE 50 UG/1
2 SPRAY, METERED NASAL DAILY
Status: DISCONTINUED | OUTPATIENT
Start: 2018-09-27 | End: 2018-09-27 | Stop reason: CLARIF

## 2018-09-27 RX ADMIN — ASPIRIN 81 MG: 81 TABLET, COATED ORAL at 21:56

## 2018-09-27 RX ADMIN — METHYLPREDNISOLONE SODIUM SUCCINATE 40 MG: 40 INJECTION, POWDER, FOR SOLUTION INTRAMUSCULAR; INTRAVENOUS at 03:54

## 2018-09-27 RX ADMIN — IPRATROPIUM BROMIDE AND ALBUTEROL SULFATE 3 ML: 2.5; .5 SOLUTION RESPIRATORY (INHALATION) at 19:43

## 2018-09-27 RX ADMIN — PREDNISONE 40 MG: 20 TABLET ORAL at 11:04

## 2018-09-27 RX ADMIN — EXENATIDE 10 MCG: 250 INJECTION SUBCUTANEOUS at 10:47

## 2018-09-27 RX ADMIN — PYRIDOSTIGMINE BROMIDE 60 MG: 60 TABLET ORAL at 16:53

## 2018-09-27 RX ADMIN — IPRATROPIUM BROMIDE AND ALBUTEROL SULFATE 3 ML: 2.5; .5 SOLUTION RESPIRATORY (INHALATION) at 16:08

## 2018-09-27 RX ADMIN — ATORVASTATIN CALCIUM 10 MG: 10 TABLET, FILM COATED ORAL at 10:44

## 2018-09-27 RX ADMIN — AZATHIOPRINE 50 MG: 50 TABLET ORAL at 10:45

## 2018-09-27 RX ADMIN — LOSARTAN POTASSIUM AND HYDROCHLOROTHIAZIDE 1 TABLET: 50; 12.5 TABLET, FILM COATED ORAL at 21:56

## 2018-09-27 RX ADMIN — IPRATROPIUM BROMIDE AND ALBUTEROL SULFATE 3 ML: 2.5; .5 SOLUTION RESPIRATORY (INHALATION) at 23:13

## 2018-09-27 RX ADMIN — LOSARTAN POTASSIUM AND HYDROCHLOROTHIAZIDE 1 TABLET: 50; 12.5 TABLET, FILM COATED ORAL at 10:44

## 2018-09-27 RX ADMIN — PYRIDOSTIGMINE BROMIDE 60 MG: 60 TABLET ORAL at 08:23

## 2018-09-27 RX ADMIN — BENZONATATE 100 MG: 100 CAPSULE ORAL at 17:34

## 2018-09-27 RX ADMIN — AZATHIOPRINE 50 MG: 50 TABLET ORAL at 16:50

## 2018-09-27 RX ADMIN — AZATHIOPRINE 50 MG: 50 TABLET ORAL at 21:56

## 2018-09-27 RX ADMIN — EXENATIDE 10 MCG: 250 INJECTION SUBCUTANEOUS at 18:00

## 2018-09-27 RX ADMIN — ENOXAPARIN SODIUM 40 MG: 40 INJECTION SUBCUTANEOUS at 21:56

## 2018-09-27 RX ADMIN — GUAIFENESIN AND CODEINE PHOSPHATE 5 ML: 100; 10 SOLUTION ORAL at 21:21

## 2018-09-27 RX ADMIN — BENZONATATE 100 MG: 100 CAPSULE ORAL at 09:17

## 2018-09-27 RX ADMIN — PYRIDOSTIGMINE BROMIDE 60 MG: 60 TABLET ORAL at 21:56

## 2018-09-27 RX ADMIN — INSULIN HUMAN 20 UNITS: 100 INJECTION, SUSPENSION SUBCUTANEOUS at 10:46

## 2018-09-27 RX ADMIN — IPRATROPIUM BROMIDE AND ALBUTEROL SULFATE 3 ML: 2.5; .5 SOLUTION RESPIRATORY (INHALATION) at 12:05

## 2018-09-27 ASSESSMENT — ACTIVITIES OF DAILY LIVING (ADL)
ADLS_ACUITY_SCORE: 11

## 2018-09-27 NOTE — PLAN OF CARE
Problem: Breathing Pattern Ineffective (Adult)  Goal: Identify Related Risk Factors and Signs and Symptoms  Related risk factors and signs and symptoms are identified upon initiation of Human Response Clinical Practice Guideline (CPG).  Outcome: No Change  ICU End of Shift Summary.  For vital signs and complete assessments, please see documentation flowsheets.     Pertinent assessments: Pt gets SOB with activity, VSS, afeb, lungs are dim, sats are good on 2 L nc, SR to SB, was on the bipap all night and slept, was up to the BR and did well, has a dry cough.  Major Shift Events: none  Plan (Upcoming Events): con't to assess  Discharge/Transfer Needs: TBD    Bedside Shift Report Completed : y  Bedside Safety Check Completed: y

## 2018-09-27 NOTE — PROGRESS NOTES
"57-year-old gentleman with a history of non-insulin-dependent diabetes hypertension and myasthenia gravis admitted to the ICU for acute hypoxic respiratory failure acute to viral bronchitis.  His NIF was -14 on admission.  He was treated conservatively with systemic steroids BiPAP and nebulized.  When I examined the patient this morning he was awake alert speaking in full sentences and reported no respiratory distress.  His NIF was -45 this a.m.  Vitals:Vital signs:  Temp: 98.3  F (36.8  C) Temp src: Oral BP: 136/88   Heart Rate: 84 Resp: 17 SpO2: 98 % O2 Device: None (Room air) Oxygen Delivery: 2 LPM Height: 170.2 cm (5' 7\") Weight: 119.9 kg (264 lb 5.3 oz)  Estimated body mass index is 41.4 kg/(m^2) as calculated from the following:    Height as of this encounter: 1.702 m (5' 7\").    Weight as of this encounter: 119.9 kg (264 lb 5.3 oz).      Exam  Awake alert  Follows commands  Chest clear to auscultation  CV regular rate and rhythm    Assessment and plan 57-year-old gentleman admitted with a myasthenia gravis crisis.  His respiratory symptoms have seemed to resolved and has no acute critical care issues at this time.  He is stable for transfer from the ICU.  While the patient is in the ICU however I will follow peripherally.    Kris Blandon MD  Chief critical care anesthesiology  [facility]  Date of service 9/27/2018  "

## 2018-09-27 NOTE — PROGRESS NOTES
RT-Pt placed on CPAP +7, 30%. Pt having apnea episodes, switched to BIPAP 12/7, 30%. Bs diminished. Spo2 93%. RT will continue to follow.

## 2018-09-27 NOTE — PROGRESS NOTES
RT- NIF and VC completed. NIF -14, VC 2190 mL. No complications noted.    Prince Patel, RT on 9/26/2018 at 8:13 PM

## 2018-09-27 NOTE — CONSULTS
Neurology Consult Note  [facility]       [2018]                                                                                       Admission Date: 2018  Hospital Day: 2      Patient: Fidel Dailey      : 1960  MRN:  [id number]     CC:      Chief Complaint   Patient presents with     Shortness of Breath       Consult Request:  Referring Provider:  Glenn Rebolledo MD  Primary Care Provider:  Tyree Alcantara MD        HPI:  Fidel Dailey is a 57 year old yo male admitted for increasing shortness of breath and worsening exercise tolerance for the last 7-10 days. He developed cough and nasal drip about 10 days ago. He started feeling increasing shortness of breath with activity. He also complained of difficulty swallowing, talking and discomfort in throat area. He has h/o myasthenia gravis diagnose about 1.5 years ago with primary symptoms of ocular involvement.  His NIF was -14 yesterday. He has felt significantly better after steroids. He denies any shortness of breath at present.          A complete review of symptoms was performed including vascular, infectious, cardiovascular, pulmonary, gastrointestinal, endocrinological, hematologic, dermatologic, musculoskeletal, and neurological. All were normal except as above.    PAST MEDICAL HISTORY:  ALLERGIES:   Allergies   Allergen Reactions     Seasonal Allergies      Tobacco:    History   Smoking Status     Never Smoker   Smokeless Tobacco     Never Used     Alcohol:    Alcohol use Yes 0.6 oz/week 1 Glasses of wine per week     MEDICATIONS:       CURRENTLY SCHEDULED MEDICATIONS     aspirin  81 mg Oral QPM     atorvastatin  10 mg Oral Daily     azaTHIOprine  50 mg Oral TID     enoxaparin  40 mg Subcutaneous Q24H     exenatide  10 mcg Subcutaneous BID AC     fluticasone  2 spray Both Nostrils Daily     insulin aspart  1-10 Units Subcutaneous TID AC     insulin aspart  1-7 Units Subcutaneous At Bedtime      insulin isophane human  20 Units Subcutaneous QAM AC     ipratropium - albuterol 0.5 mg/2.5 mg/3 mL  3 mL Nebulization Q4H While awake     losartan-hydrochlorothiazide  1 tablet Oral BID     predniSONE  40 mg Oral Daily     pyridostigmine  60 mg Oral TID          HOME MEDICATIONS  Prescriptions Prior to Admission   Medication Sig Dispense Refill Last Dose     aspirin EC 81 MG EC tablet Take 81 mg by mouth every evening    9/25/2018 at pm     atorvastatin (LIPITOR) 10 MG tablet Take 10 mg by mouth daily    9/26/2018 at am     azaTHIOprine (IMURAN) 50 MG tablet Take 50 mg by mouth 3 times daily   9/26/2018 at x2     exenatide (BYETTA 10 MCG PEN) 10 MCG/0.04ML injection Inject 10 mcg Subcutaneous 2 times daily (before meals)    9/26/2018 at am     losartan-hydrochlorothiazide (HYZAAR) 50-12.5 MG per tablet Take 1 tablet by mouth 2 times daily    9/26/2018 at am     metFORMIN (GLUCOPHAGE) 1000 MG tablet Take 1,500 mg by mouth daily (with dinner)   9/25/2018 at pm     metFORMIN (GLUCOPHAGE) 1000 MG tablet Take 1,000 mg by mouth daily (with breakfast)    9/26/2018 at am     pyridostigmine (MESTINON) 60 MG tablet Take 60 mg by mouth 3 times daily   9/26/2018 at x2     metroNIDAZOLE (METROGEL) 1 % gel Apply topically as needed   Unknown at Unknown time     mometasone (NASONEX) 50 MCG/ACT spray Spray 2 sprays into both nostrils daily as needed   Unknown at Unknown time     MEDICAL HISTORY  Past Medical History:   Diagnosis Date     Diabetes (H)      Hypertension     controlled on medication     Myasthenia gravis (H)      Uncomplicated asthma      SURGICAL HISTORY  Past Surgical History:   Procedure Laterality Date     ARTHROSCOPY KNEE Right     done twice (late 1980's and then 1999)     FAMILY HISTORY    Family History   Problem Relation Age of Onset     Glaucoma Mother      Macular Degeneration No family hx of      SOCIAL HISTORY  Social History     Social History     Marital status:      Spouse name: N/A      "Number of children: N/A     Years of education: N/A     Social History Main Topics     Smoking status: Never Smoker     Smokeless tobacco: Never Used     Alcohol use 0.6 oz/week     1 Glasses of wine per week     Drug use: No     Sexual activity: Yes     Partners: Male, Female     Other Topics Concern     None     Social History Narrative            Height: 170.2 cm (5' 7\")     Temp: 98.3  F (36.8  C)   Weight: 119.9 kg (264 lb 5.3 oz)    Temp src: Oral         BP: 136/88   Heart Rate: 84     Estimated body mass index is 41.4 kg/(m^2) as calculated from the following:    Height as of this encounter: 1.702 m (5' 7\").    Weight as of this encounter: 119.9 kg (264 lb 5.3 oz).    Resp: 17   SpO2: 98 %   O2 Device: None (Room air)     Blood Pressure:   BP Readings from Last 3 Encounters:   18 136/88     T24 : Temp (24hrs), Av  F (37.2  C), Min:97.6  F (36.4  C), Max:100.3  F (37.9  C)       GENERAL EXAMINATION:  HEENT: PERRLA, EOMI.  Neck: Supple, No carotid bruits. No myofascial tender points.  Chest: Bilateral air entry present.  Heart: S1, S2 RRR.    NEUROLOGICAL EXAMINATION  Mental Status:  Patient is awake, alert, and oriented X3. Speech and language functions are normal. Short- and long-term memory are intact.      Cranial Nerves: Pupils are equal and reactive to light.Visual fields are full to confrontation. Extraocular movements are intact. There is no nystagmus in the horizontal or vertical planes.No facial sensory deficits. No facial weakness. The tongue is midline.     Motor: Muscle tone is normal. There is no pronator drift. There is no muscle atrophy. The strength is 5/5 in upper and lower extremities bilaterally. Deep tendon reflexes are 2+ and symmetric in his biceps, triceps, knees, and ankles. Plantars are flexor. .     Coordination: .Finger to nose - normal.    Gait: not evaluated.    .  LABORATORY RESULTS        Recent Labs  Lab 18  1940 18  1546   WBC  --  10.1   HGB  --  14.5 "   HCT  --  45.4   MCV  --  86    292       Recent Labs  Lab 09/26/18  1940 09/26/18  1546 09/26/18  1545   NA  --  140  --    POTASSIUM 3.8 3.1*  --    CHLORIDE  --  102  --    CO2  --  30  --    ANIONGAP  --  8  --    GLC  --  140*  --    BUN  --  18  --    CR 0.79 0.89  --    GFRESTIMATED >90 88 87   GFRESTBLACK >90 >90 >90   ADRIA  --  9.3  --    MAG 1.6  --   --      No results for input(s): SED, CRP in the last 168 hours.    Recent Labs  Lab 09/26/18  1546   LACT 1.8     No results for input(s): COLOR, APPEARANCE, URINEGLC, URINEBILI, URINEKETONE, SG, UBLD, URINEPH, PROTEIN, UROBILINOGEN, NITRITE, LEUKEST, RBCU, WBCU in the last 168 hours.    IMAGING RESULTS     Recent Results (from the past 24 hour(s))   XR Chest Port 1 View    Narrative    CHEST ONE VIEW PORTABLE    9/26/2018 3:47 PM     HISTORY: Cough, shortness of breath.     COMPARISON: None.    FINDINGS: Upright portable chest. The heart size is normal. Allowing  for low volumes, the lungs are clear. No pneumothorax.      Impression    IMPRESSION: No acute abnormality.    MENDOZA BRODERICK MD       _____________________________________________________________________________      _____________________________________________________________________________          ASSESSMENT     1. Increased shortness of breath with activity for last 10 days with URI symptoms and viral bronchitis- Patient probably had myasthenia exacerbation due to viral bronchitis affecting his NIF. He has responded well to steroids.        RECOMMENDATIONS   1. Continue mestinon and Azathioprine.  2. Patient was advised to be proactive with any infections due to the use of Azathioprine.  3. Follow up with primary neurologist in 4 weeks.

## 2018-09-27 NOTE — PROGRESS NOTES
[facility]    Hospitalist Progress Note  Name: Fidel Dailey    MRN: [id number]  Provider:  Ash Corea DO, MPH  Date of Service: 09/27/2018    Summary of Stay: Fidel Dailey is a 57 year old male with a history of non-insulin-dependent diabetes mellitus, hypertension, and myasthenia gravis admitted on 9/26/2018 with acute hypoxic respiratory failure secondary to viral bronchitis and reactive airway disease.  Improved dramatically with initial use of BiPAP as well as systemic steroids and nebulizer treatment.  De-escalating medications today while transferring to the floor.  Likely appropriate for discharge tomorrow.    Problem List:   1. Acute hypoxic respiratory failure: Likely secondary to viral bronchitis with a component of reactive airway disease.  Lungs are clear today.  Chest x-ray without focal infiltrate.  No fever nor leukocytosis on admission.  Pro-calcitonin undetectable.  Had recent nasal congestion and sore throat likely indicating a viral process at play here.  No indication for antibiotics.  Cultures negative to date.  Change IV Solu-Medrol to prednisone 40 mg oral daily.  Will treat for 5 days total.  Continue scheduled nebulizer treatments.  Currently doing well on room air.  Mucolytic's and cough suppressants available as needed.  2. Myasthenia gravis exacerbation: Neurology consultation pending.  Continue prior to admission Mestinon and azathioprine.  NIF reasonable.  3. Non insulin-dependent diabetes mellitus with steroid-induced hyperglycemia: Significant elevation in blood sugar due to steroids.  At baseline has a hemoglobin A1c of 6.3.  Blood glucose should improve with decreasing systemic steroids.  Start NPH 20 units to be given along with prednisone.  Change to high sliding scale insulin.  Hold prior to admission metformin.  Resume prior to admission Byetta.  4. Hypertension: Blood pressure slightly elevated.  Resume his prior to admission hydrochlorothiazide 12.5 mg  daily and losartan 50 mg daily.    DVT Prophylaxis: Enoxaparin (Lovenox) SQ  Code Status: No Order  Disposition: Expected discharge in 1 days to home. Goals prior to discharge include monitor respiratory status with medication adjustments.  Patient can transfer to the general medical floor.  Incidental Findings: None.  Family updated today: No     Interval History   Assumed care from previous hospitalist. The history was fully reviewed.  The patient reports doing well. No chest pain but still mild shortness of breath. Still with cough. No nausea, vomiting, diarrhea, constipation. No fevers. No other specific complaints identified.     -Data reviewed today: I personally reviewed all new labs and imaging results over the last 24 hours.     Physical Exam   Temp: 98.3  F (36.8  C) Temp src: Oral BP: 146/90   Heart Rate: 85 Resp: 18 SpO2: 96 % O2 Device: Nasal cannula Oxygen Delivery: 2 LPM  Vitals:    09/26/18 1857 09/27/18 0500   Weight: 120 kg (264 lb 8.8 oz) 119.9 kg (264 lb 5.3 oz)     Vital Signs with Ranges  Temp:  [97.6  F (36.4  C)-100.3  F (37.9  C)] 98.3  F (36.8  C)  Heart Rate:  [56-93] 85  Resp:  [18-40] 18  BP: (108-150)/() 146/90  FiO2 (%):  [3 %-30 %] 30 %  SpO2:  [91 %-100 %] 96 %  I/O last 3 completed shifts:  In: 150 [P.O.:150]  Out: -     GENERAL: No apparent distress. Awake, alert, and fully oriented.  HEENT: Normocephalic, atraumatic. Extraocular movements intact.  CARDIOVASCULAR: Regular rate and rhythm without murmurs or rubs. No S3.  PULMONARY: Clear bilaterally.  GASTROINTESTINAL: Soft, non-tender, non-distended. Bowel sounds normoactive.   EXTREMITIES: No cyanosis or clubbing. No edema.  NEUROLOGICAL: CN 2-12 grossly intact, no focal neurological deficits.  DERMATOLOGICAL: No rash, ulcer, bruising, nor jaundice.     Medications       aspirin  81 mg Oral QPM     atorvastatin  10 mg Oral Daily     azaTHIOprine  50 mg Oral TID     enoxaparin  40 mg Subcutaneous Q24H     exenatide  10 mcg  Subcutaneous BID AC     insulin aspart  1-10 Units Subcutaneous TID AC     insulin aspart  1-7 Units Subcutaneous At Bedtime     losartan-hydrochlorothiazide  1 tablet Oral BID     mometasone  2 spray Both Nostrils Daily     predniSONE  40 mg Oral Daily     pyridostigmine  60 mg Oral TID     Data     Laboratory:    Recent Labs  Lab 09/26/18  1940 09/26/18  1546   WBC  --  10.1   HGB  --  14.5   HCT  --  45.4   MCV  --  86    292       Recent Labs  Lab 09/26/18  1940 09/26/18  1546 09/26/18  1545   NA  --  140  --    POTASSIUM 3.8 3.1*  --    CHLORIDE  --  102  --    CO2  --  30  --    ANIONGAP  --  8  --    GLC  --  140*  --    BUN  --  18  --    CR 0.79 0.89  --    GFRESTIMATED >90 88 87   GFRESTBLACK >90 >90 >90   ADRIA  --  9.3  --        Recent Labs  Lab 09/26/18  1552 09/26/18  1546   CULT No growth after 10 hours No growth after 9 hours       Imaging:  Recent Results (from the past 24 hour(s))   XR Chest Port 1 View    Narrative    CHEST ONE VIEW PORTABLE    9/26/2018 3:47 PM     HISTORY: Cough, shortness of breath.     COMPARISON: None.    FINDINGS: Upright portable chest. The heart size is normal. Allowing  for low volumes, the lungs are clear. No pneumothorax.      Impression    IMPRESSION: No acute abnormality.    MD Ash GREENWOOD DO MPH  Counts include 234 beds at the [facility] Hospitalist  [address] [place] [address]  Pager: [phone]  09/27/2018

## 2018-09-27 NOTE — PROGRESS NOTES
Patient arrived from ER via cart. Wife at bedside, admission began , pt. Settled and orientated . VSS, no resp distress noted.

## 2018-09-28 VITALS
OXYGEN SATURATION: 95 % | HEIGHT: 67 IN | HEART RATE: 89 BPM | BODY MASS INDEX: 41.37 KG/M2 | DIASTOLIC BLOOD PRESSURE: 57 MMHG | SYSTOLIC BLOOD PRESSURE: 111 MMHG | RESPIRATION RATE: 20 BRPM | TEMPERATURE: 97.4 F | WEIGHT: 263.6 LBS

## 2018-09-28 LAB
GLUCOSE BLDC GLUCOMTR-MCNC: 174 MG/DL (ref 70–99)
GLUCOSE BLDC GLUCOMTR-MCNC: 181 MG/DL (ref 70–99)
GLUCOSE BLDC GLUCOMTR-MCNC: 212 MG/DL (ref 70–99)
GLUCOSE BLDC GLUCOMTR-MCNC: 227 MG/DL (ref 70–99)
GLUCOSE BLDC GLUCOMTR-MCNC: 238 MG/DL (ref 70–99)

## 2018-09-28 PROCEDURE — 25000125 ZZHC RX 250: Performed by: HOSPITALIST

## 2018-09-28 PROCEDURE — 99239 HOSP IP/OBS DSCHRG MGMT >30: CPT | Performed by: HOSPITALIST

## 2018-09-28 PROCEDURE — 40000275 ZZH STATISTIC RCP TIME EA 10 MIN

## 2018-09-28 PROCEDURE — 94640 AIRWAY INHALATION TREATMENT: CPT | Mod: 76

## 2018-09-28 PROCEDURE — 25000132 ZZH RX MED GY IP 250 OP 250 PS 637: Performed by: INTERNAL MEDICINE

## 2018-09-28 PROCEDURE — 25000132 ZZH RX MED GY IP 250 OP 250 PS 637: Performed by: HOSPITALIST

## 2018-09-28 PROCEDURE — 94150 VITAL CAPACITY TEST: CPT

## 2018-09-28 PROCEDURE — 94640 AIRWAY INHALATION TREATMENT: CPT

## 2018-09-28 PROCEDURE — 25000131 ZZH RX MED GY IP 250 OP 636 PS 637: Performed by: HOSPITALIST

## 2018-09-28 PROCEDURE — 00000146 ZZHCL STATISTIC GLUCOSE BY METER IP

## 2018-09-28 PROCEDURE — 94660 CPAP INITIATION&MGMT: CPT

## 2018-09-28 RX ORDER — PREDNISONE 20 MG/1
TABLET ORAL
Qty: 20 TABLET | Refills: 0 | DISCHARGE
Start: 2018-09-28

## 2018-09-28 RX ADMIN — INSULIN HUMAN 20 UNITS: 100 INJECTION, SUSPENSION SUBCUTANEOUS at 08:24

## 2018-09-28 RX ADMIN — ATORVASTATIN CALCIUM 10 MG: 10 TABLET, FILM COATED ORAL at 08:19

## 2018-09-28 RX ADMIN — AZATHIOPRINE 50 MG: 50 TABLET ORAL at 08:19

## 2018-09-28 RX ADMIN — LOSARTAN POTASSIUM AND HYDROCHLOROTHIAZIDE 1 TABLET: 50; 12.5 TABLET, FILM COATED ORAL at 08:19

## 2018-09-28 RX ADMIN — PYRIDOSTIGMINE BROMIDE 60 MG: 60 TABLET ORAL at 08:20

## 2018-09-28 RX ADMIN — GUAIFENESIN AND CODEINE PHOSPHATE 5 ML: 100; 10 SOLUTION ORAL at 07:26

## 2018-09-28 RX ADMIN — PREDNISONE 40 MG: 20 TABLET ORAL at 08:20

## 2018-09-28 RX ADMIN — GUAIFENESIN AND CODEINE PHOSPHATE 5 ML: 100; 10 SOLUTION ORAL at 11:43

## 2018-09-28 RX ADMIN — IPRATROPIUM BROMIDE AND ALBUTEROL SULFATE 3 ML: 2.5; .5 SOLUTION RESPIRATORY (INHALATION) at 11:17

## 2018-09-28 RX ADMIN — IPRATROPIUM BROMIDE AND ALBUTEROL SULFATE 3 ML: 2.5; .5 SOLUTION RESPIRATORY (INHALATION) at 07:30

## 2018-09-28 ASSESSMENT — ACTIVITIES OF DAILY LIVING (ADL)
ADLS_ACUITY_SCORE: 11

## 2018-09-28 NOTE — PLAN OF CARE
Problem: Patient Care Overview  Goal: Plan of Care/Patient Progress Review  Outcome: Improving  Pt admitted with resp failure. States breathing is improved, on room air.  Continues to have coarse, dry cough.  Given robitussin with some improvement.  Ambulates independently.  Discharge 1-2 days.

## 2018-09-28 NOTE — PROGRESS NOTES
RT Note      Patient remain on BIPAP 10/6 backup rate of 12 and 30% FIO2. Patient tolerating it well along with treatments. Breath sounds are clear. When not asleep, patient is RA with saturations low to high 90's.     Will continue to follow and monitor.      Shantelle Llanos, RRT

## 2018-09-28 NOTE — PLAN OF CARE
Pt up ind. LS dim with crackles. Dyspnea even @ rest. Sats 90-91% RA. Freq nonprod cough, gave robitussin, also taking tessalon. Denies chest pain. BS+ Voiding adequate amount. Tolerating diabetic diet, denies nausea. PIV SL.

## 2018-09-28 NOTE — DISCHARGE SUMMARY
[facility]    Discharge Summary  Hospitalist    Date of Admission:  9/26/2018  Date of Discharge:  9/28/2018  Provider:  Jagdish Giordano MD  Date of Service (when I last saw the patient): 09/28/18    Discharge Diagnoses   1.  Acute hypoxemic respiratory failure, multifactorial in the setting of presumptive acute viral bronchitis, reactive airways disease and worsening of myasthenia gravis.  2.  Presumptive viral bronchitis with reactive airways disease.  3.  Myasthenia gravis exacerbation.  4.  Type 2 diabetes mellitus with steroid-induced hyperglycemia.  5.  Essential hypertension.      Other medical issues:  Past Medical History:   Diagnosis Date     Diabetes (H)      Hypertension     controlled on medication     Myasthenia gravis (H)      Uncomplicated asthma        History of Present Illness   Fidel Dailey is an 57 year old male who presented with worsening of SOB  Please see the admission history and physical for full details.    Hospital Course   Summary of Stay: Fidel Dailey is a 57 year old male with a history of non-insulin-dependent diabetes mellitus, hypertension, and myasthenia gravis admitted on 9/26/2018 with acute hypoxic respiratory failure secondary to viral bronchitis and reactive airway disease.  Improved dramatically with initial use of BiPAP as well as systemic steroids and nebulizer treatment.  De-escalating medications today while transferring to the floor.  Likely appropriate for discharge tomorrow.     Problem List:   1. Acute hypoxemic respiratory failure: multifactorial: secondary to viral bronchitis with a component of reactive airway disease and worsening of Myasthenia Gravis.  Lungs are clear today.  Chest x-ray without focal infiltrate.  No fever nor leukocytosis on admission.  Pro-calcitonin undetectable.  Had recent nasal congestion and sore throat likely indicating a viral process at play here.  No indication for antibiotics.  Cultures negative to date.   Change IV Solu-Medrol to prednisone 40 mg oral.  Continue scheduled nebulizer treatments.  Currently doing well on room air.  Mucolytic's and cough suppressants available as needed.  2. Myasthenia gravis exacerbation: Neurology consulted. Recommends continue prior to admission Mestinon and azathioprine. Home on taper down Prednisone.  NIF normal.  3. Non insulin-dependent diabetes mellitus with steroid-induced hyperglycemia: Significant elevation in blood sugar due to steroids.  At baseline has a hemoglobin A1c of 6.3.  Blood glucose should improve with decreasing systemic steroids.    Resume prior to admission Metformin and Byetta.  4. Hypertension: Blood pressure slightly elevated.  Resume his prior to admission hydrochlorothiazide 12.5 mg daily and losartan 50 mg daily.      # Discharge Pain Plan:    - Patient currently has NO PAIN and is not being prescribed pain medications on discharge.      Significant Results and Procedures   See below     Pending Results   Unresulted Labs Ordered in the Past 30 Days of this Admission     Date and Time Order Name Status Description    9/26/2018 1549 Blood culture Preliminary     9/26/2018 1537 Blood culture Preliminary           Code Status   Full Code       Primary Care Physician   Tyree Alcantara    GEN:  Alert, oriented x 3, appears comfortable, NAD.  HEENT:  Normocephalic/atraumatic, no scleral icterus, no nasal discharge, mouth moist.  CV:  Regular rate and rhythm, no murmur or JVD.  S1 + S2 noted, no S3 or S4.  LUNGS:  Clear to auscultation bilaterally without rales/rhonchi/wheezing/retractions.  Symmetric chest rise on inhalation noted.  ABD:  Active bowel sounds, soft, non-tender/non-distended.  No rebound/guarding/rigidity.  EXT:  No edema or cyanosis.  No joint synovitis noted.  SKIN:  Dry to touch, no exanthems noted in the visualized areas.     Discharge Disposition   Discharged to home    Consultations This Hospital Stay   NEUROLOGY IP CONSULT    Time Spent on this  Encounter   I, Jagdish Giordano, personally saw the patient today and spent greater than 30 minutes discharging this patient.     Discharge Orders     Reason for your hospital stay   Acute hypoxemic respiratory failure with RAD due to viral infection. Worsening of MG.     Follow-up and recommended labs and tests    Follow up with primary care provider, Tyree Alcantara, within 7 days for hospital follow- up.  No follow up labs or test are needed.  Neurology in 4 weeks     Activity   Your activity upon discharge: activity as tolerated     Full Code     Diet   Follow this diet upon discharge:       Moderate Consistent CHO Diet       Discharge Medications   Current Discharge Medication List      START taking these medications    Details   predniSONE (DELTASONE) 20 MG tablet Take 2 tabs (40 mg) daily x 3 days, 1 tab (20 mg) daily x 3 days, then 1/2 tab (10 mg) x 3 days.  Qty: 20 tablet, Refills: 0    Associated Diagnoses: Acute respiratory failure with hypoxia (H); Acute bronchitis, unspecified organism         CONTINUE these medications which have NOT CHANGED    Details   aspirin EC 81 MG EC tablet Take 81 mg by mouth every evening       atorvastatin (LIPITOR) 10 MG tablet Take 10 mg by mouth daily       azaTHIOprine (IMURAN) 50 MG tablet Take 50 mg by mouth 3 times daily      exenatide (BYETTA 10 MCG PEN) 10 MCG/0.04ML injection Inject 10 mcg Subcutaneous 2 times daily (before meals)       losartan-hydrochlorothiazide (HYZAAR) 50-12.5 MG per tablet Take 1 tablet by mouth 2 times daily       !! metFORMIN (GLUCOPHAGE) 1000 MG tablet Take 1,500 mg by mouth daily (with dinner)      !! metFORMIN (GLUCOPHAGE) 1000 MG tablet Take 1,000 mg by mouth daily (with breakfast)       pyridostigmine (MESTINON) 60 MG tablet Take 60 mg by mouth 3 times daily      metroNIDAZOLE (METROGEL) 1 % gel Apply topically as needed      mometasone (NASONEX) 50 MCG/ACT spray Spray 2 sprays into both nostrils daily as needed       !! - Potential  duplicate medications found. Please discuss with provider.        Allergies   Allergies   Allergen Reactions     Seasonal Allergies      Data   Most Recent 3 CBC's:  Recent Labs   Lab Test  09/26/18   1940  09/26/18   1546   WBC   --   10.1   HGB   --   14.5   MCV   --   86   PLT  276  292      Most Recent 3 BMP's:  Recent Labs   Lab Test  09/26/18 1940  09/26/18   1546   NA   --   140   POTASSIUM  3.8  3.1*   CHLORIDE   --   102   CO2   --   30   BUN   --   18   CR  0.79  0.89   ANIONGAP   --   8   ADRIA   --   9.3   GLC   --   140*     Most Recent 2 LFT's:No lab results found.  Most Recent INR's and Anticoagulation Dosing History:  Anticoagulation Dose History     There is no flowsheet data to display.        Most Recent 3 Troponin's:  Recent Labs   Lab Test  09/26/18   1546   TROPI  <0.015     Most Recent Cholesterol Panel:No lab results found.  Most Recent 6 Bacteria Isolates From Any Culture (See EPIC Reports for Culture Details):  Recent Labs   Lab Test  09/26/18   1552  09/26/18   1546   CULT  No growth after 2 days  No growth after 2 days     Most Recent TSH, T4 and A1c Labs:  Recent Labs   Lab Test  09/26/18 1940   A1C  6.3*     Results for orders placed or performed during the hospital encounter of 09/26/18   XR Chest Port 1 View    Narrative    CHEST ONE VIEW PORTABLE    9/26/2018 3:47 PM     HISTORY: Cough, shortness of breath.     COMPARISON: None.    FINDINGS: Upright portable chest. The heart size is normal. Allowing  for low volumes, the lungs are clear. No pneumothorax.      Impression    IMPRESSION: No acute abnormality.    MENDOZA BRODERICK MD         Disclaimer: This note consists of symbols derived from keyboarding, dictation and/or voice recognition software. As a result, there may be errors in the script that have gone undetected. Please consider this when interpreting information found in this chart.

## 2018-09-28 NOTE — PLAN OF CARE
Problem: Patient Care Overview  Goal: Plan of Care/Patient Progress Review  Outcome: Improving  A/Ox4. Up ind. VSS. Slept well on Bipap over night. Frequent dry cough.LS- diminished with fine crackles. Mod Carb diet. BG-212. No c/o pain BS +. Plans to discharge home today

## 2018-09-28 NOTE — PLAN OF CARE
"Problem: Patient Care Overview  Goal: Plan of Care/Patient Progress Review  Nursing cares between [id number].  Pt stable. AVS discussed w/pt. No meds given (pt has prednisone at home, directions discusses w/pt on how to take). Wife driving pt home. Belongings taken with.   /57 (BP Location: Right arm)  Pulse 89  Temp 97.4  F (36.3  C) (Oral)  Resp 20  Ht 1.702 m (5' 7\")  Wt 119.6 kg (263 lb 9.6 oz)  SpO2 95%  BMI 41.29 kg/m2        "

## 2018-10-02 LAB
BACTERIA SPEC CULT: NO GROWTH
BACTERIA SPEC CULT: NO GROWTH
Lab: NORMAL
Lab: NORMAL
SPECIMEN SOURCE: NORMAL
SPECIMEN SOURCE: NORMAL

## 2019-01-18 NOTE — LETTER
2017         RE:  :  MRN: Fidel Dailey  1960  [id number]     Dear Dr. De La Paz,    Thank you for asking me to see your very pleasant patient, Fidel Dailey, in neuro-ophthalmic consultation.  I would like to thank you for sending your records and I have summarized them in the history of present illness. He presented with his spouse who provided additional history.  My assessment and plan are below.  For further details, please see my attached clinic note.      Assessment & Plan     Fidel Dailey is a 56 year old male with the following diagnoses:   1. Myasthenia (H)    2. Subjective visual disturbance    3. Alternating exotropia       Fidel Dailey is a 56 year old male presenting with 9 week history of acute onset left ptosis and diplopia. Positive Cogan lid twitch and positive ice pack test and sleep test  in clinic today, concerning for possible diagnosis of myasthenia gravis. This would fit with his previous similar symptoms last October with the right eyelid. His MRI was not strongly convincing for third nerve schwannoma. Will trial Mestinon and check myasthenia labs, as well as chest CT to evaluate for mediastinal tumor.  Follow up 3 weeks sooner as needed for worsening symptoms.      Again, thank you for allowing me to participate in the care of your patient.      Sincerely,    Tee Yost MD  Professor, Neuro-Ophthalmology  Department of Ophthalmology and Visual Neurosciences  [facility]    CC: Tyree Alcantara MD  [facility]  [address] [address]  VIA Facsimile: [phone]     Pushpa De La Paz  [facility]  [address] [address]  VIA Outside Provider Messaging       DX = myasthenia gravis   wife

## 2019-10-03 ENCOUNTER — HEALTH MAINTENANCE LETTER (OUTPATIENT)
Age: 59
End: 2019-10-03

## 2020-11-07 ENCOUNTER — HEALTH MAINTENANCE LETTER (OUTPATIENT)
Age: 60
End: 2020-11-07

## 2021-09-05 ENCOUNTER — HEALTH MAINTENANCE LETTER (OUTPATIENT)
Age: 61
End: 2021-09-05

## 2021-12-26 ENCOUNTER — HEALTH MAINTENANCE LETTER (OUTPATIENT)
Age: 61
End: 2021-12-26

## 2022-10-23 ENCOUNTER — HEALTH MAINTENANCE LETTER (OUTPATIENT)
Age: 62
End: 2022-10-23

## 2022-12-10 NOTE — PROGRESS NOTES
"SPIRITUAL HEALTH SERVICES Progress Note  [facility]    SH visit with pt, Augie, as he waved at me as I walked by.   Augie shares that he has been in the hospital with some breathing challenges and that he is now \"probably the healthiest person here now.\" He hopes to go home tomorrow.   Augie briefly shares that he is [employer] and has many family Pastors in his extended family background. He enjoys [employer] puzzles and is expecting his wife to arrive soon. She will be bringing him his computer and then \"I'll have plenty to do.\"   No needs expressed. No formal plans to follow.    LIZZETH Hogue.  Staff    Pager #[phone]     " Mitral insufficiency

## 2023-04-02 ENCOUNTER — HEALTH MAINTENANCE LETTER (OUTPATIENT)
Age: 63
End: 2023-04-02